# Patient Record
Sex: MALE | Race: WHITE | Employment: OTHER | URBAN - METROPOLITAN AREA
[De-identification: names, ages, dates, MRNs, and addresses within clinical notes are randomized per-mention and may not be internally consistent; named-entity substitution may affect disease eponyms.]

---

## 2019-10-19 ENCOUNTER — APPOINTMENT (OUTPATIENT)
Dept: CT IMAGING | Age: 84
End: 2019-10-19
Payer: MEDICARE

## 2019-10-19 ENCOUNTER — HOSPITAL ENCOUNTER (OUTPATIENT)
Age: 84
Setting detail: OBSERVATION
LOS: 1 days | Discharge: HOME | End: 2019-10-20
Attending: EMERGENCY MEDICINE | Admitting: INTERNAL MEDICINE
Payer: MEDICARE

## 2019-10-19 DIAGNOSIS — R41.0 DELIRIUM: Primary | ICD-10-CM

## 2019-10-19 DIAGNOSIS — R44.1 VISUAL HALLUCINATIONS: ICD-10-CM

## 2019-10-19 PROBLEM — G93.41 METABOLIC ENCEPHALOPATHY: Status: ACTIVE | Noted: 2019-10-19

## 2019-10-19 PROBLEM — R41.82 ALTERED MENTAL STATUS: Status: ACTIVE | Noted: 2019-10-19

## 2019-10-19 LAB
ALBUMIN SERPL-MCNC: 4.7 G/DL (ref 3.5–5.2)
ALP BLD-CCNC: 107 U/L (ref 40–129)
ALT SERPL-CCNC: 24 U/L (ref 0–40)
AMMONIA: <10 UMOL/L (ref 16–60)
ANION GAP SERPL CALCULATED.3IONS-SCNC: 11 MMOL/L (ref 7–16)
APTT: 29.5 SEC (ref 25.7–34.7)
AST SERPL-CCNC: 59 U/L (ref 0–39)
BACTERIA: ABNORMAL /HPF
BASOPHILS ABSOLUTE: 0.04 E9/L (ref 0–0.2)
BASOPHILS RELATIVE PERCENT: 0.6 % (ref 0–2)
BILIRUB SERPL-MCNC: 0.4 MG/DL (ref 0–1.2)
BILIRUBIN URINE: NEGATIVE
BLOOD, URINE: NEGATIVE
BUN BLDV-MCNC: 19 MG/DL (ref 8–23)
CALCIUM SERPL-MCNC: 9.6 MG/DL (ref 8.6–10.2)
CHLORIDE BLD-SCNC: 99 MMOL/L (ref 98–107)
CLARITY: CLEAR
CO2: 28 MMOL/L (ref 22–29)
COLOR: YELLOW
CREAT SERPL-MCNC: 1 MG/DL (ref 0.7–1.2)
EKG ATRIAL RATE: 83 BPM
EKG P AXIS: 47 DEGREES
EKG P-R INTERVAL: 134 MS
EKG Q-T INTERVAL: 376 MS
EKG QRS DURATION: 76 MS
EKG QTC CALCULATION (BAZETT): 439 MS
EKG R AXIS: 4 DEGREES
EKG T AXIS: 24 DEGREES
EKG VENTRICULAR RATE: 82 BPM
EOSINOPHILS ABSOLUTE: 0.13 E9/L (ref 0.05–0.5)
EOSINOPHILS RELATIVE PERCENT: 2.1 % (ref 0–6)
EPITHELIAL CELLS, UA: ABNORMAL /HPF
GFR AFRICAN AMERICAN: >60
GFR NON-AFRICAN AMERICAN: >60 ML/MIN/1.73
GLUCOSE BLD-MCNC: 102 MG/DL (ref 74–99)
GLUCOSE URINE: NEGATIVE MG/DL
HCT VFR BLD CALC: 33.8 % (ref 37–54)
HEMOGLOBIN: 11 G/DL (ref 12.5–16.5)
IMMATURE GRANULOCYTES #: 0.02 E9/L
IMMATURE GRANULOCYTES %: 0.3 % (ref 0–5)
INR BLD: 1.1
KETONES, URINE: NEGATIVE MG/DL
LACTIC ACID: 1.6 MMOL/L (ref 0.5–2.2)
LEUKOCYTE ESTERASE, URINE: NEGATIVE
LYMPHOCYTES ABSOLUTE: 0.78 E9/L (ref 1.5–4)
LYMPHOCYTES RELATIVE PERCENT: 12.6 % (ref 20–42)
MAGNESIUM: 1.9 MG/DL (ref 1.6–2.6)
MCH RBC QN AUTO: 31.1 PG (ref 26–35)
MCHC RBC AUTO-ENTMCNC: 32.5 % (ref 32–34.5)
MCV RBC AUTO: 95.5 FL (ref 80–99.9)
MONOCYTES ABSOLUTE: 0.86 E9/L (ref 0.1–0.95)
MONOCYTES RELATIVE PERCENT: 13.9 % (ref 2–12)
NEUTROPHILS ABSOLUTE: 4.34 E9/L (ref 1.8–7.3)
NEUTROPHILS RELATIVE PERCENT: 70.5 % (ref 43–80)
NITRITE, URINE: NEGATIVE
PDW BLD-RTO: 13.2 FL (ref 11.5–15)
PH UA: 7.5 (ref 5–9)
PLATELET # BLD: 202 E9/L (ref 130–450)
PMV BLD AUTO: 10.4 FL (ref 7–12)
POTASSIUM SERPL-SCNC: 5.7 MMOL/L (ref 3.5–5)
PROTEIN UA: NEGATIVE MG/DL
PROTHROMBIN TIME: 11.7 SEC (ref 9.3–12.4)
RBC # BLD: 3.54 E12/L (ref 3.8–5.8)
RBC UA: ABNORMAL /HPF (ref 0–2)
SODIUM BLD-SCNC: 138 MMOL/L (ref 132–146)
SPECIFIC GRAVITY UA: 1.01 (ref 1–1.03)
TOTAL CK: 176 U/L (ref 20–200)
TOTAL PROTEIN: 7.9 G/DL (ref 6.4–8.3)
TROPONIN: <0.01 NG/ML (ref 0–0.03)
UROBILINOGEN, URINE: 1 E.U./DL
WBC # BLD: 6.2 E9/L (ref 4.5–11.5)
WBC UA: ABNORMAL /HPF (ref 0–5)

## 2019-10-19 PROCEDURE — 93005 ELECTROCARDIOGRAM TRACING: CPT | Performed by: EMERGENCY MEDICINE

## 2019-10-19 PROCEDURE — A0426 ALS 1: HCPCS

## 2019-10-19 PROCEDURE — 84484 ASSAY OF TROPONIN QUANT: CPT

## 2019-10-19 PROCEDURE — 2580000003 HC RX 258: Performed by: EMERGENCY MEDICINE

## 2019-10-19 PROCEDURE — 82550 ASSAY OF CK (CPK): CPT

## 2019-10-19 PROCEDURE — 80053 COMPREHEN METABOLIC PANEL: CPT

## 2019-10-19 PROCEDURE — 81001 URINALYSIS AUTO W/SCOPE: CPT

## 2019-10-19 PROCEDURE — 85025 COMPLETE CBC W/AUTO DIFF WBC: CPT

## 2019-10-19 PROCEDURE — 93010 ELECTROCARDIOGRAM REPORT: CPT | Performed by: INTERNAL MEDICINE

## 2019-10-19 PROCEDURE — 36415 COLL VENOUS BLD VENIPUNCTURE: CPT

## 2019-10-19 PROCEDURE — 83735 ASSAY OF MAGNESIUM: CPT

## 2019-10-19 PROCEDURE — 96372 THER/PROPH/DIAG INJ SC/IM: CPT

## 2019-10-19 PROCEDURE — 82140 ASSAY OF AMMONIA: CPT

## 2019-10-19 PROCEDURE — 99285 EMERGENCY DEPT VISIT HI MDM: CPT

## 2019-10-19 PROCEDURE — 87088 URINE BACTERIA CULTURE: CPT

## 2019-10-19 PROCEDURE — 83605 ASSAY OF LACTIC ACID: CPT

## 2019-10-19 PROCEDURE — 70450 CT HEAD/BRAIN W/O DYE: CPT

## 2019-10-19 PROCEDURE — 85730 THROMBOPLASTIN TIME PARTIAL: CPT

## 2019-10-19 PROCEDURE — A0425 GROUND MILEAGE: HCPCS

## 2019-10-19 PROCEDURE — 2580000003 HC RX 258: Performed by: INTERNAL MEDICINE

## 2019-10-19 PROCEDURE — 85610 PROTHROMBIN TIME: CPT

## 2019-10-19 PROCEDURE — 6370000000 HC RX 637 (ALT 250 FOR IP): Performed by: INTERNAL MEDICINE

## 2019-10-19 PROCEDURE — 71046 X-RAY EXAM CHEST 2 VIEWS: CPT

## 2019-10-19 PROCEDURE — G0378 HOSPITAL OBSERVATION PER HR: HCPCS

## 2019-10-19 PROCEDURE — 94760 N-INVAS EAR/PLS OXIMETRY 1: CPT

## 2019-10-19 PROCEDURE — 6360000002 HC RX W HCPCS: Performed by: INTERNAL MEDICINE

## 2019-10-19 PROCEDURE — 87040 BLOOD CULTURE FOR BACTERIA: CPT

## 2019-10-19 RX ORDER — 0.9 % SODIUM CHLORIDE 0.9 %
1000 INTRAVENOUS SOLUTION INTRAVENOUS ONCE
Status: COMPLETED | OUTPATIENT
Start: 2019-10-19 | End: 2019-10-19

## 2019-10-19 RX ORDER — ASPIRIN 81 MG/1
81 TABLET, CHEWABLE ORAL DAILY
COMMUNITY
End: 2019-11-01

## 2019-10-19 RX ORDER — M-VIT,TX,IRON,MINS/CALC/FOLIC 27MG-0.4MG
1 TABLET ORAL DAILY
Status: DISCONTINUED | OUTPATIENT
Start: 2019-10-19 | End: 2019-10-20 | Stop reason: HOSPADM

## 2019-10-19 RX ORDER — SODIUM CHLORIDE 0.9 % (FLUSH) 0.9 %
10 SYRINGE (ML) INJECTION EVERY 12 HOURS SCHEDULED
Status: DISCONTINUED | OUTPATIENT
Start: 2019-10-19 | End: 2019-10-20 | Stop reason: HOSPADM

## 2019-10-19 RX ORDER — SODIUM CHLORIDE 0.9 % (FLUSH) 0.9 %
10 SYRINGE (ML) INJECTION PRN
Status: DISCONTINUED | OUTPATIENT
Start: 2019-10-19 | End: 2019-10-20 | Stop reason: HOSPADM

## 2019-10-19 RX ORDER — ONDANSETRON 2 MG/ML
4 INJECTION INTRAMUSCULAR; INTRAVENOUS EVERY 6 HOURS PRN
Status: DISCONTINUED | OUTPATIENT
Start: 2019-10-19 | End: 2019-10-20 | Stop reason: HOSPADM

## 2019-10-19 RX ORDER — LANOLIN ALCOHOL/MO/W.PET/CERES
500 CREAM (GRAM) TOPICAL DAILY
Status: DISCONTINUED | OUTPATIENT
Start: 2019-10-19 | End: 2019-10-20 | Stop reason: HOSPADM

## 2019-10-19 RX ORDER — VITS A,C,E/LUTEIN/MINERALS 300MCG-200
1 TABLET ORAL DAILY
Status: DISCONTINUED | OUTPATIENT
Start: 2019-10-19 | End: 2019-10-20 | Stop reason: HOSPADM

## 2019-10-19 RX ADMIN — Medication 10 ML: at 21:00

## 2019-10-19 RX ADMIN — ENOXAPARIN SODIUM 30 MG: 30 INJECTION SUBCUTANEOUS at 18:15

## 2019-10-19 RX ADMIN — Medication 500 MCG: at 18:15

## 2019-10-19 RX ADMIN — SODIUM CHLORIDE 1000 ML: 9 INJECTION, SOLUTION INTRAVENOUS at 07:21

## 2019-10-19 RX ADMIN — MULTIPLE VITAMINS W/ MINERALS TAB 1 TABLET: TAB at 18:17

## 2019-10-19 RX ADMIN — Medication 1 TABLET: at 18:16

## 2019-10-20 VITALS
DIASTOLIC BLOOD PRESSURE: 64 MMHG | WEIGHT: 103 LBS | TEMPERATURE: 98.7 F | RESPIRATION RATE: 14 BRPM | SYSTOLIC BLOOD PRESSURE: 123 MMHG | HEIGHT: 67 IN | OXYGEN SATURATION: 98 % | HEART RATE: 68 BPM | BODY MASS INDEX: 16.17 KG/M2

## 2019-10-20 LAB
C-REACTIVE PROTEIN: 0.6 MG/DL (ref 0–0.4)
SEDIMENTATION RATE, ERYTHROCYTE: 30 MM/HR (ref 0–15)

## 2019-10-20 PROCEDURE — G0378 HOSPITAL OBSERVATION PER HR: HCPCS

## 2019-10-20 PROCEDURE — 85651 RBC SED RATE NONAUTOMATED: CPT

## 2019-10-20 PROCEDURE — 86140 C-REACTIVE PROTEIN: CPT

## 2019-10-20 PROCEDURE — 6370000000 HC RX 637 (ALT 250 FOR IP): Performed by: INTERNAL MEDICINE

## 2019-10-20 PROCEDURE — 2580000003 HC RX 258: Performed by: INTERNAL MEDICINE

## 2019-10-20 PROCEDURE — 96372 THER/PROPH/DIAG INJ SC/IM: CPT

## 2019-10-20 PROCEDURE — 36415 COLL VENOUS BLD VENIPUNCTURE: CPT

## 2019-10-20 PROCEDURE — 6360000002 HC RX W HCPCS: Performed by: INTERNAL MEDICINE

## 2019-10-20 RX ADMIN — Medication 500 MCG: at 08:50

## 2019-10-20 RX ADMIN — ENOXAPARIN SODIUM 30 MG: 30 INJECTION SUBCUTANEOUS at 08:48

## 2019-10-20 RX ADMIN — MULTIPLE VITAMINS W/ MINERALS TAB 1 TABLET: TAB at 08:49

## 2019-10-20 RX ADMIN — Medication 1 TABLET: at 09:42

## 2019-10-20 RX ADMIN — Medication 10 ML: at 08:50

## 2019-10-20 ASSESSMENT — PAIN SCALES - GENERAL: PAINLEVEL_OUTOF10: 0

## 2019-10-21 LAB — URINE CULTURE, ROUTINE: NORMAL

## 2019-10-22 ENCOUNTER — HOSPITAL ENCOUNTER (OUTPATIENT)
Age: 84
Discharge: HOME | End: 2019-10-22
Payer: MEDICARE

## 2019-10-24 LAB — BLOOD CULTURE, ROUTINE: NORMAL

## 2019-11-01 ENCOUNTER — APPOINTMENT (OUTPATIENT)
Dept: GENERAL RADIOLOGY | Age: 84
DRG: 072 | End: 2019-11-01
Payer: MEDICARE

## 2019-11-01 ENCOUNTER — HOSPITAL ENCOUNTER (OUTPATIENT)
Age: 84
Discharge: HOME | End: 2019-11-01
Payer: MEDICARE

## 2019-11-01 ENCOUNTER — APPOINTMENT (OUTPATIENT)
Dept: ULTRASOUND IMAGING | Age: 84
DRG: 072 | End: 2019-11-01
Payer: MEDICARE

## 2019-11-01 ENCOUNTER — HOSPITAL ENCOUNTER (INPATIENT)
Age: 84
LOS: 2 days | Discharge: HOME | DRG: 072 | End: 2019-11-03
Attending: EMERGENCY MEDICINE | Admitting: INTERNAL MEDICINE
Payer: MEDICARE

## 2019-11-01 LAB
ACETAMINOPHEN LEVEL: <5 MCG/ML (ref 10–30)
ALBUMIN SERPL-MCNC: 4.3 G/DL (ref 3.5–5.2)
ALP BLD-CCNC: 96 U/L (ref 40–129)
ALT SERPL-CCNC: 14 U/L (ref 0–40)
AMMONIA: <10 UMOL/L (ref 16–60)
AMPHETAMINE SCREEN, URINE: NOT DETECTED
ANION GAP SERPL CALCULATED.3IONS-SCNC: 13 MMOL/L (ref 7–16)
AST SERPL-CCNC: 28 U/L (ref 0–39)
BARBITURATE SCREEN URINE: NOT DETECTED
BASOPHILS ABSOLUTE: 0.04 E9/L (ref 0–0.2)
BASOPHILS RELATIVE PERCENT: 0.8 % (ref 0–2)
BENZODIAZEPINE SCREEN, URINE: NOT DETECTED
BILIRUB SERPL-MCNC: 0.5 MG/DL (ref 0–1.2)
BILIRUBIN URINE: NEGATIVE
BLOOD, URINE: NEGATIVE
BUN BLDV-MCNC: 12 MG/DL (ref 8–23)
CALCIUM SERPL-MCNC: 9.7 MG/DL (ref 8.6–10.2)
CANNABINOID SCREEN URINE: NOT DETECTED
CEA: 4.5 NG/ML (ref 0–5.2)
CHLORIDE BLD-SCNC: 102 MMOL/L (ref 98–107)
CLARITY: CLEAR
CO2: 27 MMOL/L (ref 22–29)
COCAINE METABOLITE SCREEN URINE: NOT DETECTED
COLOR: YELLOW
CREAT SERPL-MCNC: 0.8 MG/DL (ref 0.7–1.2)
EKG ATRIAL RATE: 87 BPM
EKG P AXIS: 35 DEGREES
EKG P-R INTERVAL: 116 MS
EKG Q-T INTERVAL: 364 MS
EKG QRS DURATION: 80 MS
EKG QTC CALCULATION (BAZETT): 438 MS
EKG R AXIS: -8 DEGREES
EKG T AXIS: 15 DEGREES
EKG VENTRICULAR RATE: 87 BPM
EOSINOPHILS ABSOLUTE: 0.09 E9/L (ref 0.05–0.5)
EOSINOPHILS RELATIVE PERCENT: 1.8 % (ref 0–6)
ETHANOL: <10 MG/DL (ref 0–0.08)
GFR AFRICAN AMERICAN: >60
GFR NON-AFRICAN AMERICAN: >60 ML/MIN/1.73
GLUCOSE BLD-MCNC: 93 MG/DL (ref 74–99)
GLUCOSE URINE: NEGATIVE MG/DL
HCT VFR BLD CALC: 30.4 % (ref 37–54)
HEMOGLOBIN: 9.8 G/DL (ref 12.5–16.5)
IMMATURE GRANULOCYTES #: 0.04 E9/L
IMMATURE GRANULOCYTES %: 0.8 % (ref 0–5)
KETONES, URINE: NEGATIVE MG/DL
LEUKOCYTE ESTERASE, URINE: NEGATIVE
LYMPHOCYTES ABSOLUTE: 0.81 E9/L (ref 1.5–4)
LYMPHOCYTES RELATIVE PERCENT: 15.8 % (ref 20–42)
Lab: NORMAL
MCH RBC QN AUTO: 31.1 PG (ref 26–35)
MCHC RBC AUTO-ENTMCNC: 32.2 % (ref 32–34.5)
MCV RBC AUTO: 96.5 FL (ref 80–99.9)
METHADONE SCREEN, URINE: NOT DETECTED
MONOCYTES ABSOLUTE: 0.67 E9/L (ref 0.1–0.95)
MONOCYTES RELATIVE PERCENT: 13.1 % (ref 2–12)
NEUTROPHILS ABSOLUTE: 3.47 E9/L (ref 1.8–7.3)
NEUTROPHILS RELATIVE PERCENT: 67.7 % (ref 43–80)
NITRITE, URINE: NEGATIVE
OPIATE SCREEN URINE: NOT DETECTED
PDW BLD-RTO: 13.1 FL (ref 11.5–15)
PH UA: 6.5 (ref 5–9)
PHENCYCLIDINE SCREEN URINE: NOT DETECTED
PLATELET # BLD: 256 E9/L (ref 130–450)
PMV BLD AUTO: 9.8 FL (ref 7–12)
POTASSIUM SERPL-SCNC: 4 MMOL/L (ref 3.5–5)
PROPOXYPHENE SCREEN: NOT DETECTED
PROTEIN UA: NEGATIVE MG/DL
RBC # BLD: 3.15 E12/L (ref 3.8–5.8)
SALICYLATE, SERUM: <0.3 MG/DL (ref 0–30)
SODIUM BLD-SCNC: 142 MMOL/L (ref 132–146)
SPECIFIC GRAVITY UA: <=1.005 (ref 1–1.03)
TOTAL PROTEIN: 8.4 G/DL (ref 6.4–8.3)
TRICYCLIC ANTIDEPRESSANTS SCREEN SERUM: NEGATIVE NG/ML
TROPONIN: <0.01 NG/ML (ref 0–0.03)
UROBILINOGEN, URINE: 0.2 E.U./DL
WBC # BLD: 5.1 E9/L (ref 4.5–11.5)

## 2019-11-01 PROCEDURE — 96372 THER/PROPH/DIAG INJ SC/IM: CPT

## 2019-11-01 PROCEDURE — 1200000000 HC SEMI PRIVATE

## 2019-11-01 PROCEDURE — 80307 DRUG TEST PRSMV CHEM ANLYZR: CPT

## 2019-11-01 PROCEDURE — 93010 ELECTROCARDIOGRAM REPORT: CPT | Performed by: INTERNAL MEDICINE

## 2019-11-01 PROCEDURE — 96375 TX/PRO/DX INJ NEW DRUG ADDON: CPT

## 2019-11-01 PROCEDURE — 93005 ELECTROCARDIOGRAM TRACING: CPT | Performed by: EMERGENCY MEDICINE

## 2019-11-01 PROCEDURE — 2580000003 HC RX 258: Performed by: EMERGENCY MEDICINE

## 2019-11-01 PROCEDURE — 36415 COLL VENOUS BLD VENIPUNCTURE: CPT

## 2019-11-01 PROCEDURE — G0480 DRUG TEST DEF 1-7 CLASSES: HCPCS

## 2019-11-01 PROCEDURE — A0428 BLS: HCPCS

## 2019-11-01 PROCEDURE — 80053 COMPREHEN METABOLIC PANEL: CPT

## 2019-11-01 PROCEDURE — 2580000003 HC RX 258: Performed by: INTERNAL MEDICINE

## 2019-11-01 PROCEDURE — 71045 X-RAY EXAM CHEST 1 VIEW: CPT

## 2019-11-01 PROCEDURE — 96374 THER/PROPH/DIAG INJ IV PUSH: CPT

## 2019-11-01 PROCEDURE — 97165 OT EVAL LOW COMPLEX 30 MIN: CPT

## 2019-11-01 PROCEDURE — A0425 GROUND MILEAGE: HCPCS

## 2019-11-01 PROCEDURE — 76705 ECHO EXAM OF ABDOMEN: CPT

## 2019-11-01 PROCEDURE — 84484 ASSAY OF TROPONIN QUANT: CPT

## 2019-11-01 PROCEDURE — 97530 THERAPEUTIC ACTIVITIES: CPT

## 2019-11-01 PROCEDURE — 85025 COMPLETE CBC W/AUTO DIFF WBC: CPT

## 2019-11-01 PROCEDURE — 6360000002 HC RX W HCPCS: Performed by: INTERNAL MEDICINE

## 2019-11-01 PROCEDURE — 81003 URINALYSIS AUTO W/O SCOPE: CPT

## 2019-11-01 PROCEDURE — 82378 CARCINOEMBRYONIC ANTIGEN: CPT

## 2019-11-01 PROCEDURE — 82140 ASSAY OF AMMONIA: CPT

## 2019-11-01 PROCEDURE — 70450 CT HEAD/BRAIN W/O DYE: CPT

## 2019-11-01 PROCEDURE — 6370000000 HC RX 637 (ALT 250 FOR IP): Performed by: INTERNAL MEDICINE

## 2019-11-01 PROCEDURE — 6360000002 HC RX W HCPCS: Performed by: EMERGENCY MEDICINE

## 2019-11-01 PROCEDURE — 99285 EMERGENCY DEPT VISIT HI MDM: CPT

## 2019-11-01 PROCEDURE — 96361 HYDRATE IV INFUSION ADD-ON: CPT

## 2019-11-01 RX ORDER — DIPHENHYDRAMINE HYDROCHLORIDE 50 MG/ML
12.5 INJECTION INTRAMUSCULAR; INTRAVENOUS ONCE
Status: COMPLETED | OUTPATIENT
Start: 2019-11-01 | End: 2019-11-01

## 2019-11-01 RX ORDER — 0.9 % SODIUM CHLORIDE 0.9 %
250 INTRAVENOUS SOLUTION INTRAVENOUS ONCE
Status: COMPLETED | OUTPATIENT
Start: 2019-11-01 | End: 2019-11-01

## 2019-11-01 RX ORDER — LORAZEPAM 2 MG/ML
0.5 INJECTION INTRAMUSCULAR ONCE
Status: COMPLETED | OUTPATIENT
Start: 2019-11-01 | End: 2019-11-01

## 2019-11-01 RX ORDER — M-VIT,TX,IRON,MINS/CALC/FOLIC 27MG-0.4MG
1 TABLET ORAL DAILY
Status: DISCONTINUED | OUTPATIENT
Start: 2019-11-01 | End: 2019-11-03 | Stop reason: HOSPADM

## 2019-11-01 RX ORDER — HYDROCODONE/ACETAMINOPHEN 5 MG-500MG
1 TABLET ORAL DAILY
Status: DISCONTINUED | OUTPATIENT
Start: 2019-11-01 | End: 2019-11-01 | Stop reason: CLARIF

## 2019-11-01 RX ORDER — SODIUM CHLORIDE 0.9 % (FLUSH) 0.9 %
10 SYRINGE (ML) INJECTION PRN
Status: DISCONTINUED | OUTPATIENT
Start: 2019-11-01 | End: 2019-11-03 | Stop reason: HOSPADM

## 2019-11-01 RX ORDER — MEMANTINE HYDROCHLORIDE 5 MG/1
5 TABLET ORAL DAILY
Status: DISCONTINUED | OUTPATIENT
Start: 2019-11-01 | End: 2019-11-03 | Stop reason: HOSPADM

## 2019-11-01 RX ORDER — ONDANSETRON 2 MG/ML
4 INJECTION INTRAMUSCULAR; INTRAVENOUS EVERY 6 HOURS PRN
Status: DISCONTINUED | OUTPATIENT
Start: 2019-11-01 | End: 2019-11-03 | Stop reason: HOSPADM

## 2019-11-01 RX ORDER — TRAZODONE HYDROCHLORIDE 50 MG/1
25 TABLET ORAL NIGHTLY
Status: DISCONTINUED | OUTPATIENT
Start: 2019-11-01 | End: 2019-11-03 | Stop reason: HOSPADM

## 2019-11-01 RX ORDER — SODIUM CHLORIDE 0.9 % (FLUSH) 0.9 %
10 SYRINGE (ML) INJECTION EVERY 12 HOURS SCHEDULED
Status: DISCONTINUED | OUTPATIENT
Start: 2019-11-01 | End: 2019-11-03 | Stop reason: HOSPADM

## 2019-11-01 RX ADMIN — DIPHENHYDRAMINE HYDROCHLORIDE 12.5 MG: 50 INJECTION, SOLUTION INTRAMUSCULAR; INTRAVENOUS at 02:20

## 2019-11-01 RX ADMIN — LORAZEPAM 0.5 MG: 2 INJECTION, SOLUTION INTRAMUSCULAR; INTRAVENOUS at 04:19

## 2019-11-01 RX ADMIN — Medication 10 ML: at 12:21

## 2019-11-01 RX ADMIN — TRAZODONE HYDROCHLORIDE 25 MG: 50 TABLET ORAL at 21:34

## 2019-11-01 RX ADMIN — MULTIPLE VITAMINS W/ MINERALS TAB 1 TABLET: TAB at 12:21

## 2019-11-01 RX ADMIN — ENOXAPARIN SODIUM 40 MG: 40 INJECTION SUBCUTANEOUS at 12:21

## 2019-11-01 RX ADMIN — Medication 10 ML: at 21:35

## 2019-11-01 RX ADMIN — SODIUM CHLORIDE 250 ML: 9 INJECTION, SOLUTION INTRAVENOUS at 02:09

## 2019-11-01 ASSESSMENT — PAIN SCALES - GENERAL
PAINLEVEL_OUTOF10: 0

## 2019-11-01 NOTE — H&P
Admission History and Physical                                                                                    Mark Cervantes MD, FACP                   Patient Name: Erica Gomez                   Age:  80 y.o. Gender:   male    CC: Visual hallucinations-and altered mental status    HPI: Patient recently discharged for the same symptoms. The patient had been on 20 mg of paroxetine. This was stopped and he resolved. However then he was not sleeping. He came to the office and was doing reasonably well with no more hallucinations. He opted to try 7.5 mg of mirtazapine. He started having difficulty with that so it was stopped the same reason. Then he was unable to sleep and not slept for 24 hours. Then he began demonstrating paranoid behavior and had visual hallucinations. He has a history of dementia. 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  Hoarseness     Hyperlipidemia     Hypertension     no recent problems    Macular degeneration        Past Surgical History:   Procedure Laterality Date    APPENDECTOMY      CATARACT REMOVAL WITH IMPLANT Bilateral     LARYNGOSCOPY  01/30/2014    LUNG SURGERY Left 20 years ago    \"portion of lung removed, bottom lobe for mass, no further traetments\"       No family status information on file. Prior to Admission medications    Not on File        Social History     Socioeconomic History    Marital status:       Spouse name: None    Number of children: None    Years of education: None    Highest education level: None   Occupational History    None   Social Needs    Financial resource strain: None    Food insecurity:     Worry: None     Inability: None    Transportation needs:     Medical: None BP Readings from Last 3 Encounters:   11/01/19 (!) 158/67   10/20/19 123/64   03/08/17 (!) 160/70     Pulse Readings from Last 3 Encounters:   11/01/19 85   10/20/19 68   03/08/17 76       General appearance: Frail somnolent following Ativan  Skin: Color-sallow, texture, turgor normal. No rashes or lesions. Head: Normocephalic. No masses, lesions, tenderness or abnormalities   Face: Symmetric no visible lesions  Eyes: Conjunctivae/cornea clear. [place]. Sclera non icteric. Ears: External appearance normal.  Hearing grossly reduced  Mouth: Lips and tongue appear normal.   Neck:  Symmetric. No adenopathy. Lungs: Clear to auscultation. No rhonchi, crackles or rales. Heart: S1 > S2. Rhythm is regular and rate is normal. No gallop rub or murmur. Abdomen: Soft, there is a suspected mass in the epigastrium and across over the liver this is not been noted on previous examinations. However on past examinations his abdomen has always been tense  Extremities: No deformities, edema, or skin discoloration. Peripheral perfusion assessed in all exremities. No cyanosis  Musculoskeletal: No unusual pain or swelling. Muscular strength intact. Neuro:   · Cranial nerves grossly intact.    · The rest is difficult to assess because he has been given sedation  Mental status: Sedated  Mood: Sedated  Gait & balance: Normally ambulatory independently     Labs     CBC:   Lab Results   Component Value Date    WBC 5.1 11/01/2019    RBC 3.15 11/01/2019    HGB 9.8 11/01/2019    HCT 30.4 11/01/2019     11/01/2019    MCV 96.5 11/01/2019     BMP:    Lab Results   Component Value Date     11/01/2019    K 4.0 11/01/2019     11/01/2019    CO2 27 11/01/2019    BUN 12 11/01/2019    CREATININE 0.8 11/01/2019    GLUCOSE 93 11/01/2019    GLUCOSE 93 06/05/2012    CALCIUM 9.7 11/01/2019     Hepatic Function Panel:    Lab Results   Component Value Date    ALKPHOS 96 11/01/2019    AST 28 11/01/2019    ALT 14 11/01/2019 PROT 8.4 11/01/2019    LABALBU 4.3 11/01/2019    LABALBU 4.3 06/05/2012    BILITOT 0.5 11/01/2019     Magnesium:    Lab Results   Component Value Date    MG 1.9 10/19/2019     Cardiac Enzymes:   Lab Results   Component Value Date    CKTOTAL 176 10/19/2019    CKTOTAL 74 03/31/2011    CKMB 1.1 03/31/2011    TROPONINI <0.01 11/01/2019    TROPONINI <0.01 10/19/2019    TROPONINI <0.01 03/31/2011     LDH:  No results found for: LDH  PT/INR:    Lab Results   Component Value Date    PROTIME 11.7 10/19/2019    PROTIME 11.6 03/31/2011    INR 1.1 10/19/2019     BNP: No results for input(s): BNP in the last 72 hours. TSH: No results found for: TSH   Cardiac Injury Profile:   Recent Labs     11/01/19  0215   TROPONINI <0.01      Lipid Profile:   Lab Results   Component Value Date    TRIG 92 01/27/2014    HDL 55 01/27/2014    LDLCALC 81 01/27/2014    CHOL 154 01/27/2014      Hemoglobin A1C: No components found for: HGBA1C   U/A:   Lab Results   Component Value Date    LEUKOCYTESUR Negative 11/01/2019    PHUR 6.5 11/01/2019    WBCUA 0-1 10/19/2019    RBCUA NONE 10/19/2019    BACTERIA RARE 10/19/2019    SPECGRAV <=1.005 11/01/2019    BLOODU Negative 11/01/2019    GLUCOSEU Negative 11/01/2019         ADMISSION SCHEDULED MEDS:   No current facility-administered medications for this encounter. Current  Infusions      Prn Meds      Radiology Review:  XR CHEST 1 VW   Final Result   Calcified pleural plaques pleural-parenchymal scarring. CT Head WO Contrast   Final Result   1. Chronic microvascular infarcts and mild generalized volume loss. 2. Negative for acute intracranial pathology. 3. Scattered tiny bubbles of gas adjacent to the facial bones and the skull    base, most likely intravenous and likely iatrogenic.        This report has been electronically signed by aMuri Mckinney MD.            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 However this does not not appear to be recent    Problem list:  Patient Active Problem List   Diagnosis    Altered mental status    Metabolic encephalopathy       PLAN:  I suspect abdominal neoplasm.   Will obtain imaging  Advanced directive status is DNR CC-no intervention desired   Will start trazodone for sleep  Start transition to skilled facility            See  Orders  Yovani Orellana MD, Da Dawson, American Board of Internal Medicine  Diplomate, Geriatric Medicine, [address] of Internal Medicine  9:45 AM  11/1/2019

## 2019-11-01 NOTE — CARE COORDINATION
Met with both daughters, discussed 24hr care, Naresh Crisostomo who lives with pt states that family lives within 5 minutes of their home and family and friends visit and that it is a busy household. They are declining HHC at this point. Kathi LUBIN

## 2019-11-01 NOTE — PLAN OF CARE
Problem: Risk for Impaired Skin Integrity  Goal: Tissue integrity - skin and mucous membranes  Description  Structural intactness and normal physiological function of skin and  mucous membranes.   Outcome: Met This Shift     Problem: Confusion - Acute:  Goal: Absence of continued neurological deterioration signs and symptoms  Description  Absence of continued neurological deterioration signs and symptoms  Outcome: Met This Shift

## 2019-11-01 NOTE — PROGRESS NOTES
OCCUPATIONAL THERAPY INITIAL EVALUATION      Date:2019  Patient Name: Best Ross  MRN: [id number]  : 2/10/1927  Room: [address] [place] [address]      Evaluating OT: Carlynn Merlin, OTR/L #[id number]    AM-PAC Daily Activity Raw Score: 1824    Recommended Adaptive Equipment: To be further assessed      Diagnosis:    1. Altered mental status, unspecified altered mental status type          Pertinent Medical History: macular degeneration, dementia     Precautions:  Falls, bed alarm     Home Living: Pt lives with daughter(who works 5am-1)  in a 1 story with 1 step(s) to enter and 0rail(s); Bathroom setup: tub with no DME  Equipment owned: none  Prior Level of Function: Independent  with ADLs , assist  with IADLs; using no AD for ambulation. Driving: no    Pain Level: 0/10  Cognition: A&O: self only, pt stated his daughter was his wife, confused to place, month and year; Follows 2 step directions   Memory:  poor   Sequencing:  fair    Problem solving:  fair    Judgement/safety:  poor     Functional Assessment:   Initial Eval Status  Date: 19 Treatment Status  Date: Short Term Goals  Treatment frequency: PRN    Feeding Stand by Assist   Independent    Grooming Stand by Assist   Supervision    UB Dressing Stand by Assist   Supervision    LB Dressing Stand by Assist   Supervision    Bathing Minimal Assist  Supervision    Toileting Stand by Assist   Supervision    Bed Mobility  Supine to sit: NT   Sit to supine: Supervision   Supine to sit:  Independent   Sit to supine: Independent    Functional Transfers Sit to stand: Supervision   Stand to sit: Supervision   Stand pivot: Stand by Assist   Modified [place]    Functional Mobility SBA with no AD  supervision for safety   Balance Sitting:     Static:  wfl    Dynamic:SBA  Standing: CGA     Activity Tolerance fair     Visual/  Perceptual Glasses: no  Macular degeneration                Hand dominance: R  UE ROM: RUE:  WFL  LUE:  WFL  Strength: RUE: grossly 4/5 LUE: grossly 4/5   Strength: B WFL  Fine Motor Coordination:  B WFL    Hearing: WFL  Sensation:  No c/o numbness or tingling  Tone:  WFL  Edema: None noted                            Comments/Treatment: Upon arrival, patient in the bathroom with daughter present in room standing by bathroom door. Pt was disoriented to place, month, year and daughter. Pt demonstrated slight unsteady dynamic standing balance during functional mobility with no AD. At end of session, patient in bed, bed alarm activated, with call light and phone within reach, all lines and tubes intact. Pt demonstrating poor understanding of education/techniques. Patient would benefit from continued OT  to improve cognition,  functional independence and quality of life. Eval Complexity: Low    Assessment of current deficits   Functional mobility [x]  ADLs [x] Strength [x]  Cognition [x]  Functional transfers  [x] IADLs [] Safety Awareness [x]  Endurance [x]  Fine Motor Coordination [] Balance [x] Vision/perception [x] Sensation []   Gross Motor Coordination [] ROM [] Delirium []                  Motor Control []    Plan of Care:   ADL retraining [x]   Equipment needs [x]   Neuromuscular re-education [x] Energy Conservation Techniques [x]  Functional Transfer training [x] Patient and/or Family Education [x]  Functional Mobility training [x]  Environmental Modifications [x]  Cognitive re-training [x]   Compensatory techniques for ADLs [x]  Splinting Needs []   Positioning to improve overall function [x]   Therapeutic Activity [x]  Therapeutic Exercise  [x]  Visual/Perceptual: [x]    Delirium prevention/treatment  []   Other:  []    Rehab Potential: Good for established goals    Patient / Family Goal:  Not stated     [] Malnutrition indicators have been identified and nursing has been notified to ensure a dietitian consult is ordered.      Evaluation time includes thorough review of current medical information, gathering information on past medical &

## 2019-11-01 NOTE — ED PROVIDER NOTES
HPI:  11/1/19,   Time: 2:07 AM         Suzi Monroy is a 80 y.o. male presenting to the ED for altered mental status and hallucinations, beginning more than 6 hours ago. The complaint has been intermittent, moderate in severity, and worsened by nothing. Family also reports patient has not slept in over 24 hours. He was recently hospitalized for altered mental status approximately 2 weeks ago and had some medication changed at that time. He was recently started on Remeron but was advised by PCP office today to stop the Remeron. Family reports that he lives with his other daughter and that was seeing things and the thought that people were out to get him according to the family and again they state he has not slept in over 24 hours    ROS:   Pertinent positives and negatives are stated within HPI, all other systems reviewed and are negative.  --------------------------------------------- PAST HISTORY ---------------------------------------------  Past Medical History:  has a past medical history of Cancer ([facility] [address] History of EKG, Hoarseness, Hyperlipidemia, Hypertension, and Macular degeneration. Past Surgical History:  has a past surgical history that includes Lung surgery (Left, 20 years ago); Cataract removal with implant (Bilateral); Appendectomy; and laryngoscopy (01/30/2014). Social History:  reports that he has quit smoking. He has never used smokeless tobacco. He reports that he drinks alcohol. He reports that he does not use drugs. Family History: family history is not on file. The patients home medications have been reviewed. Allergies: Patient has no known allergies.     -------------------------------------------------- RESULTS -------------------------------------------------  All laboratory and radiology results have been personally reviewed by myself   LABS:  Results for orders placed or performed during the hospital encounter of 11/01/19   Comprehensive Metabolic Panel   Result Value Ref Range    Sodium 142 132 - 146 mmol/L    Potassium 4.0 3.5 - 5.0 mmol/L    Chloride 102 98 - 107 mmol/L    CO2 27 22 - 29 mmol/L    Anion Gap 13 7 - 16 mmol/L    Glucose 93 74 - 99 mg/dL    BUN 12 8 - 23 mg/dL    CREATININE 0.8 0.7 - 1.2 mg/dL    GFR Non-African American >60 >=60 mL/min/1.73    GFR African American >60     Calcium 9.7 8.6 - 10.2 mg/dL    Total Protein 8.4 (H) 6.4 - 8.3 g/dL    Alb 4.3 3.5 - 5.2 g/dL    Total Bilirubin 0.5 0.0 - 1.2 mg/dL    Alkaline Phosphatase 96 40 - 129 U/L    ALT 14 0 - 40 U/L    AST 28 0 - 39 U/L   CBC Auto Differential   Result Value Ref Range    WBC 5.1 4.5 - 11.5 E9/L    RBC 3.15 (L) 3.80 - 5.80 E12/L    Hemoglobin 9.8 (L) 12.5 - 16.5 g/dL    Hematocrit 30.4 (L) 37.0 - 54.0 %    MCV 96.5 80.0 - 99.9 fL    MCH 31.1 26.0 - 35.0 pg    MCHC 32.2 32.0 - 34.5 %    RDW 13.1 11.5 - 15.0 fL    Platelets 293 823 - 744 E9/L    MPV 9.8 7.0 - 12.0 fL    Neutrophils % 67.7 43.0 - 80.0 %    Immature Granulocytes % 0.8 0.0 - 5.0 %    Lymphocytes % 15.8 (L) 20.0 - 42.0 %    Monocytes % 13.1 (H) 2.0 - 12.0 %    Eosinophils % 1.8 0.0 - 6.0 %    Basophils % 0.8 0.0 - 2.0 %    Neutrophils Absolute 3.47 1.80 - 7.30 E9/L    Immature Granulocytes # 0.04 E9/L    Lymphocytes Absolute 0.81 (L) 1.50 - 4.00 E9/L    Monocytes Absolute 0.67 0.10 - 0.95 E9/L    Eosinophils Absolute 0.09 0.05 - 0.50 E9/L    Basophils Absolute 0.04 0.00 - 0.20 E9/L   Troponin   Result Value Ref Range    Troponin <0.01 0.00 - 0.03 ng/mL   Urinalysis   Result Value Ref Range    Color, UA Yellow Straw/Yellow    Clarity, UA Clear Clear    Glucose, Ur Negative Negative mg/dL    Bilirubin Urine Negative Negative    Ketones, Urine Negative Negative mg/dL    Specific Gravity, UA <=1.005 1.005 - 1.030    Blood, Urine Negative Negative    pH, UA 6.5 5.0 - 9.0    Protein, UA Negative Negative mg/dL    Urobilinogen, Urine 0.2 <2.0 E.U./dL    Nitrite, Urine Negative Negative    Leukocyte Esterase, Urine Negative Negative   Serum Drug Screen   Result Value Ref Range    Ethanol Lvl <10 mg/dL    Acetaminophen Level <5.0 (L) 10.0 - 00.7 mcg/mL    Salicylate, Serum <8.7 0.0 - 30.0 mg/dL       RADIOLOGY:  Interpreted by Radiologist.  CT Head WO Contrast   Final Result   1. Chronic microvascular infarcts and mild generalized volume loss. 2. Negative for acute intracranial pathology. 3. Scattered tiny bubbles of gas adjacent to the facial bones and the skull    base, most likely intravenous and likely iatrogenic. This report has been electronically signed by Melonie Olvera MD.      XR CHEST 1 VW    (Results Pending)       ------------------------- NURSING NOTES AND VITALS REVIEWED ---------------------------   The nursing notes within the ED encounter and vital signs as below have been reviewed. BP (!) 158/88   Pulse 96   Temp 98.7 °F (37.1 °C) (Oral)   Resp 20   Ht 5' 6\" (1.676 m)   Wt 100 lb (45.4 kg)   SpO2 98%   BMI 16.14 kg/m²   Oxygen Saturation Interpretation: Normal      ---------------------------------------------------PHYSICAL EXAM--------------------------------------      Constitutional/General: Alert and oriented x3, well appearing, non toxic in NAD; patient is awake and cooperative but is confused  Head: NC/AT  Eyes: PERRL, EOMI  Mouth: Oropharynx clear, handling secretions, no trismus  Neck: Supple, full ROM, no meningeal signs  Pulmonary: Lungs clear to auscultation bilaterally, no wheezes, rales, or rhonchi. Not in respiratory distress  Cardiovascular:  Regular rate and rhythm, no murmurs, gallops, or rubs. 2+ distal pulses  Abdomen: Soft, non tender, non distended,   Extremities: Moves all extremities x 4. Warm and well perfused  Skin: warm and dry without rash  Neurologic: GCS 15, he does not appear to have any focal deficits. Cranial nerves II through XII are intact. He has good strength upper and lower extremities. He has no drift.   Speech is normal.  He does not appear to be hallucinating at this time unspecified altered mental status type        DISPOSITION  Disposition: Admit to med/surg floor  Patient condition is stable                  Bimal Malagon MD  11/01/19 4001

## 2019-11-02 PROCEDURE — 96372 THER/PROPH/DIAG INJ SC/IM: CPT

## 2019-11-02 PROCEDURE — 6360000002 HC RX W HCPCS: Performed by: INTERNAL MEDICINE

## 2019-11-02 PROCEDURE — 6370000000 HC RX 637 (ALT 250 FOR IP): Performed by: INTERNAL MEDICINE

## 2019-11-02 PROCEDURE — 1200000000 HC SEMI PRIVATE

## 2019-11-02 PROCEDURE — 2580000003 HC RX 258: Performed by: INTERNAL MEDICINE

## 2019-11-02 RX ADMIN — MULTIPLE VITAMINS W/ MINERALS TAB 1 TABLET: TAB at 09:16

## 2019-11-02 RX ADMIN — Medication 10 ML: at 09:16

## 2019-11-02 RX ADMIN — ENOXAPARIN SODIUM 40 MG: 40 INJECTION SUBCUTANEOUS at 09:16

## 2019-11-02 RX ADMIN — Medication 10 ML: at 20:05

## 2019-11-02 RX ADMIN — MEMANTINE HYDROCHLORIDE 5 MG: 5 TABLET, FILM COATED ORAL at 09:16

## 2019-11-02 RX ADMIN — TRAZODONE HYDROCHLORIDE 25 MG: 50 TABLET ORAL at 20:05

## 2019-11-02 ASSESSMENT — PAIN SCALES - GENERAL
PAINLEVEL_OUTOF10: 0

## 2019-11-02 NOTE — PROGRESS NOTES
Kristie Smalls MD, FACP                   Patient Name: Marilu Rangel                   Age:  80 y.o. Gender:   male    CC: Visual hallucinations-and altered mental status    HPI: Patient recently discharged for the same symptoms. The patient had been on 20 mg of paroxetine. This was stopped and he resolved. However then he was not sleeping. He came to the office and was doing reasonably well with no more hallucinations. He opted to try 7.5 mg of mirtazapine. He started having difficulty with that so it was stopped the same reason. Then he was unable to sleep and not slept for 24 hours. Then he began demonstrating paranoid behavior and had visual hallucinations. He has a history of dementia. He was seen this morning with his daughter and the history was confirmed    11/2  After some careful hydration and start of trazodone and Namenda patient is awake and alert and feels well  Daughter is at bedside there have been no more episodes of hallucination or behaviors  He states he is quite comfortable  The ultrasound of the abdomen is negative  The patient has no symptoms  Discussed with daughter and the need for further evaluation is overshadowed by the patient's age and general condition. 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     Hoarseness     Hyperlipidemia     Hypertension     no recent problems    Macular degeneration        Past Surgical History:   Procedure Laterality Date    APPENDECTOMY      CATARACT REMOVAL WITH IMPLANT Bilateral     LARYNGOSCOPY  01/30/2014    LUNG SURGERY Left 20 years ago    \"portion of lung removed, bottom lobe for mass, no further traetments\"       No family status information on bowels. He has always been very slight and in fact has gained some weight since last visit. There has been no dramatic weight loss      Physical Examination:      Wt Readings from Last 3 Encounters:   11/01/19 101 lb 6 oz (46 kg)   10/19/19 103 lb (46.7 kg)   03/08/17 103 lb (46.7 kg)     Temp Readings from Last 3 Encounters:   11/02/19 97.6 °F (36.4 °C) (Temporal)   10/20/19 98.7 °F (37.1 °C) (Temporal)   03/08/17 98.2 °F (36.8 °C)     BP Readings from Last 3 Encounters:   11/02/19 (!) 151/78   10/20/19 123/64   03/08/17 (!) 160/70     Pulse Readings from Last 3 Encounters:   11/02/19 78   10/20/19 68   03/08/17 76       General appearance: He is awake and alert and feels well  Skin: Color-sallow, texture, turgor normal. No rashes or lesions. Head: Normocephalic. No masses, lesions, tenderness or abnormalities   Face: Symmetric no visible lesions  Eyes: Conjunctivae/cornea clear. [place]. Sclera non icteric. Ears: External appearance normal.  Hearing grossly reduced  Mouth: Lips and tongue appear normal.   Neck:  Symmetric. No adenopathy. Lungs: Clear to auscultation. No rhonchi, crackles or rales. Heart: S1 > S2. Rhythm is regular and rate is normal. No gallop rub or murmur. Abdomen: Soft, there is a suspected mass in the epigastrium and across over the liver this is discrete not been noted on previous examinations. However on past examinations his abdomen has always been tense  Extremities: No deformities, edema, or skin discoloration. Peripheral perfusion assessed in all exremities. No cyanosis  Musculoskeletal: No unusual pain or swelling. Muscular strength intact. Neuro:   · Cranial nerves grossly intact.    · The rest is difficult to assess because he has been given sedation  Mental status: Sedated  Mood: Sedated  Gait & balance: Normally ambulatory independently     Labs     CBC:   Lab Results   Component Value Date    WBC 5.1 11/01/2019    RBC 3.15 11/01/2019    HGB 9.8 11/01/2019    HCT 30.4 11/01/2019     11/01/2019    MCV 96.5 11/01/2019     BMP:    Lab Results   Component Value Date     11/01/2019    K 4.0 11/01/2019     11/01/2019    CO2 27 11/01/2019    BUN 12 11/01/2019    CREATININE 0.8 11/01/2019    GLUCOSE 93 11/01/2019    GLUCOSE 93 06/05/2012    CALCIUM 9.7 11/01/2019     Hepatic Function Panel:    Lab Results   Component Value Date    ALKPHOS 96 11/01/2019    AST 28 11/01/2019    ALT 14 11/01/2019    PROT 8.4 11/01/2019    LABALBU 4.3 11/01/2019    LABALBU 4.3 06/05/2012    BILITOT 0.5 11/01/2019     Magnesium:    Lab Results   Component Value Date    MG 1.9 10/19/2019     Cardiac Enzymes:   Lab Results   Component Value Date    CKTOTAL 176 10/19/2019    CKTOTAL 74 03/31/2011    CKMB 1.1 03/31/2011    TROPONINI <0.01 11/01/2019    TROPONINI <0.01 10/19/2019    TROPONINI <0.01 03/31/2011     LDH:  No results found for: LDH  PT/INR:    Lab Results   Component Value Date    PROTIME 11.7 10/19/2019    PROTIME 11.6 03/31/2011    INR 1.1 10/19/2019     BNP: No results for input(s): BNP in the last 72 hours.    TSH: No results found for: TSH   Cardiac Injury Profile:   Recent Labs     11/01/19  0215   TROPONINI <0.01      Lipid Profile:   Lab Results   Component Value Date    TRIG 92 01/27/2014    HDL 55 01/27/2014    LDLCALC 81 01/27/2014    CHOL 154 01/27/2014      Hemoglobin A1C: No components found for: HGBA1C   U/A:   Lab Results   Component Value Date    LEUKOCYTESUR Negative 11/01/2019    PHUR 6.5 11/01/2019    WBCUA 0-1 10/19/2019    RBCUA NONE 10/19/2019    BACTERIA RARE 10/19/2019    SPECGRAV <=1.005 11/01/2019    BLOODU Negative 11/01/2019    GLUCOSEU Negative 11/01/2019         ADMISSION SCHEDULED MEDS:   Current Facility-Administered Medications   Medication Dose Route Frequency Provider Last Rate Last Dose    therapeutic multivitamin-minerals 1 tablet  1 tablet Oral Daily Bart Peppers, MD   1 tablet at 11/02/19 0958    sodium chloride flush 0.9 % injection skilled facility            See  Orders  Sg Izquierdo MD, Madelaine Santacruz American Board of Internal Medicine  Diplomate, Geriatric Medicine, [address] [place] [address] Rd., [address] of Internal Medicine  10:55 AM  11/2/2019

## 2019-11-03 VITALS
HEART RATE: 75 BPM | RESPIRATION RATE: 16 BRPM | TEMPERATURE: 98.3 F | SYSTOLIC BLOOD PRESSURE: 144 MMHG | DIASTOLIC BLOOD PRESSURE: 73 MMHG | WEIGHT: 101.38 LBS | HEIGHT: 66 IN | OXYGEN SATURATION: 96 % | BODY MASS INDEX: 16.29 KG/M2

## 2019-11-03 PROCEDURE — 6360000002 HC RX W HCPCS: Performed by: INTERNAL MEDICINE

## 2019-11-03 PROCEDURE — 6370000000 HC RX 637 (ALT 250 FOR IP): Performed by: INTERNAL MEDICINE

## 2019-11-03 PROCEDURE — 96372 THER/PROPH/DIAG INJ SC/IM: CPT

## 2019-11-03 PROCEDURE — 2580000003 HC RX 258: Performed by: INTERNAL MEDICINE

## 2019-11-03 RX ORDER — CHOLECALCIFEROL (VITAMIN D3) 125 MCG
500 CAPSULE ORAL DAILY
Qty: 30 TABLET | Refills: 5 | Status: ON HOLD | OUTPATIENT
Start: 2019-11-03 | End: 2021-01-01

## 2019-11-03 RX ORDER — MEMANTINE HYDROCHLORIDE 5 MG/1
5 TABLET ORAL DAILY
Qty: 60 TABLET | Refills: 3 | Status: ON HOLD | OUTPATIENT
Start: 2019-11-04 | End: 2021-01-01

## 2019-11-03 RX ORDER — M-VIT,TX,IRON,MINS/CALC/FOLIC 27MG-0.4MG
1 TABLET ORAL DAILY
Qty: 30 TABLET | Refills: 5 | Status: ON HOLD | OUTPATIENT
Start: 2019-11-04 | End: 2021-01-01

## 2019-11-03 RX ORDER — HYDROCODONE/ACETAMINOPHEN 5 MG-500MG
1 TABLET ORAL DAILY
Qty: 30 CAPSULE | Refills: 5 | Status: ON HOLD | OUTPATIENT
Start: 2019-11-03 | End: 2021-01-01

## 2019-11-03 RX ORDER — TRAZODONE HYDROCHLORIDE 50 MG/1
25 TABLET ORAL NIGHTLY
Qty: 30 TABLET | Refills: 5 | Status: SHIPPED | OUTPATIENT
Start: 2019-11-03

## 2019-11-03 RX ADMIN — ENOXAPARIN SODIUM 40 MG: 40 INJECTION SUBCUTANEOUS at 09:08

## 2019-11-03 RX ADMIN — MULTIPLE VITAMINS W/ MINERALS TAB 1 TABLET: TAB at 09:07

## 2019-11-03 RX ADMIN — Medication 10 ML: at 09:08

## 2019-11-03 RX ADMIN — MEMANTINE HYDROCHLORIDE 5 MG: 5 TABLET, FILM COATED ORAL at 09:07

## 2019-11-03 ASSESSMENT — PAIN SCALES - GENERAL: PAINLEVEL_OUTOF10: 0

## 2019-11-03 NOTE — DISCHARGE SUMMARY
Additional info: Alt mental state TECHNIQUE: Imaging protocol: Computed tomography of the head without contrast. Radiation optimization: All CT scans at this facility use at least one of these dose optimization techniques: automated exposure control; mA and/or kV adjustment per patient size (includes targeted exams where dose is matched to clinical indication); or iterative reconstruction. COMPARISON: CT HEAD WO CONTRAST 10/19/2019 6:53 AM FINDINGS:  BRAIN: Patchy hypodensities in subcortical white matter tracts of hemispheres consistent with chronic microvascular infarcts/ischemic changes. .Negative for acute hemorrhage or extra axial collections. VENTRICLES SULCI, FISSURES, BASAL CISTERNS: Moderate ventriculomegaly with proportional temporal horns. Moderately enlarged sulci in the cerebral hemispheres. VASCULATURE: Atherosclerotic calcifications of internal carotid arteries . PARANASAL SINUSES: The visualized paranasal sinuses are well-developed and aerated. CALVARIUM /SKULL BASE /FACIAL BONES: Bony sella and visualized foramina appear normal. No destructive lesions. Negative for fractures. MASTOID AIR CELLS: Pneumatized. ORBITS: No abnormality in visualized bilateral orbits. EXTRACRANIAL SOFT TISSUES: Scattered tiny bubbles of gas adjacent to the facial bones and the skull base are probably intravenous. 1. Chronic microvascular infarcts and mild generalized volume loss. 2. Negative for acute intracranial pathology. 3. Scattered tiny bubbles of gas adjacent to the facial bones and the skull base, most likely intravenous and likely iatrogenic.  This report has been electronically signed by Simon Marquez MD.    Xr Chest 1 Vw    Result Date: 2019  Patient MRN: [id number] : 2/10/1927 Age:  80 years Gender: Male Order Date: 2019 2:15 AM Exam: XR CHEST 1 VIEW Number of Images: 1 view Indication:   alt ms alt ms Comparison: 2019 FINDINGS: Heart and pulmonary vascularity normal. 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 There are increased markings particularly at the right base which are thought related to scarring as well and are also long-standing. Calcified pleural plaques pleural-parenchymal scarring. Us Abdomen Limited    Result Date: 2019  Patient MRN: [id number] : 2/10/1927 Age:  80 years Gender: Male Order Date: 2019 11:00 AM Exam: [facility] Number of Images: 52 images Indication:   Suspected epigastric right upper quadrant mass Comparison: 2013 CT chest. Findings: Sonographic examination of the right upper abdomen shows a normal sized gallbladder containing multiple dependent echogenic calculi with associated acoustic shadowing. These were also present on the lower images of the CT scan of the chest on 2013. There is no thickening of the gallbladder wall or pericholecystic fluid. There was a negative Davila's sign. A portion of the common bile duct was demonstrated, measuring up to 3 mm in diameter. The liver is normal in size, measuring 11.2 cm in sagittal length. There is no focal lesion or intrahepatic biliary dilatation. The visualized pancreas shows no enlargement or mass lesion. The right kidney is somewhat small, measuring 8.3 x 4.3 x 4.3 cm in diameter. There is no mass, calculi, or hydronephrosis. The visualized upper portions of the abdominal aorta and inferior vena cava appear normal. There is no apparent free fluid. Cholelithiasis, also present on a CT scan of the chest on 2013. No evidence of cholecystitis. Small right kidney. Otherwise normal study. Hospital Course:  Patient recently discharged for the same symptoms. The patient had been on 20 mg of paroxetine. This was stopped and he resolved.   However then he was not sleeping. He came to the office and was doing reasonably well with no more hallucinations. He opted to try 7.5 mg of mirtazapine. He started having difficulty with that so it was stopped the same reason. Then he was unable to sleep and not slept for 24 hours. Then he began demonstrating paranoid behavior and had visual hallucinations. He has a history of dementia.     He was seen this morning with his daughter and the history was confirmed     11/2  After some careful hydration and start of trazodone and Namenda patient is awake and alert and feels well  Daughter is at bedside there have been no more episodes of hallucination or behaviors  He states he is quite comfortable  The ultrasound of the abdomen is negative  The patient has no symptoms  Discussed with daughter and the need for further evaluation is overshadowed by the patient's age and general condition. At this point CT scan will not be performed    After multiple discussions with the daughter the patient and the daughter wish to go home. Since the alteration in the medication once again he feels well  He is alert and oriented with only mild confusion  He is independently ambulatory    Discharge Medications: Yvan Corry   Home Medication Instructions CWD:[id number]    Printed on:11/03/19 3697   Medication Information                      Lutein 6 MG CAPS  Take 1 capsule by mouth daily             memantine (NAMENDA) 5 MG tablet  Take 1 tablet by mouth daily             Multiple Vitamins-Minerals (THERAPEUTIC MULTIVITAMIN-MINERALS) tablet  Take 1 tablet by mouth daily             traZODone (DESYREL) 50 MG tablet  Take 0.5 tablets by mouth nightly             vitamin B-12 (CYANOCOBALAMIN) 500 MCG tablet  Take 1 tablet by mouth daily                 Discharge Exam:  General appearance: He is awake and alert and feels well  Skin: Color-sallow, texture, turgor normal. No rashes or lesions. Head: Normocephalic.  No masses, lesions, tenderness or abnormalities   Face: Symmetric no visible lesions  Eyes: Conjunctivae/cornea clear. Marta Running. Sclera non icteric. Ears: External appearance normal.  Hearing grossly reduced  Mouth: Lips and tongue appear normal.   Neck:  Symmetric. No adenopathy. Lungs: Clear to auscultation. No rhonchi, crackles or rales. Heart: S1 > S2. Rhythm is regular and rate is normal. No gallop rub or murmur. Abdomen: Soft, musculature is relaxed there are no masses noted and he is nontender bowel sounds are normal  Extremities: No deformities, edema, or skin discoloration. Peripheral perfusion assessed in all exremities. No cyanosis  Musculoskeletal: No unusual pain or swelling. Muscular strength intact. Neuro:   · Cranial nerves grossly intact.    · There is no focal neurologic sign  Mental status: Awake alert interactive-states he feels well  Gait & balance: Normally ambulatory independently           Disposition: home    Patient Instructions:   REFER TO AVR document    Please note the medication revision on the AVR patient will follow-up in approximately 1 week    Signed:  Ronni Mina  [facility]  [employer]  11/3/2019, 9:16 AM

## 2021-01-01 ENCOUNTER — APPOINTMENT (OUTPATIENT)
Dept: GENERAL RADIOLOGY | Age: 86
DRG: 871 | End: 2021-01-01
Payer: MEDICARE

## 2021-01-01 ENCOUNTER — APPOINTMENT (OUTPATIENT)
Dept: CT IMAGING | Age: 86
DRG: 871 | End: 2021-01-01
Payer: MEDICARE

## 2021-01-01 ENCOUNTER — HOSPITAL ENCOUNTER (INPATIENT)
Age: 86
LOS: 4 days | Discharge: HOSPICE | DRG: 871 | End: 2021-08-20
Attending: EMERGENCY MEDICINE | Admitting: FAMILY MEDICINE
Payer: MEDICARE

## 2021-01-01 VITALS
OXYGEN SATURATION: 100 % | WEIGHT: 87.9 LBS | DIASTOLIC BLOOD PRESSURE: 70 MMHG | HEART RATE: 76 BPM | TEMPERATURE: 98 F | SYSTOLIC BLOOD PRESSURE: 142 MMHG | BODY MASS INDEX: 14.13 KG/M2 | HEIGHT: 66 IN | RESPIRATION RATE: 17 BRPM

## 2021-01-01 DIAGNOSIS — J18.9 PNEUMONIA OF RIGHT MIDDLE LOBE DUE TO INFECTIOUS ORGANISM: Primary | ICD-10-CM

## 2021-01-01 LAB
ALBUMIN SERPL-MCNC: 3.6 G/DL (ref 3.5–5.2)
ALP BLD-CCNC: 150 U/L (ref 40–129)
ALT SERPL-CCNC: 480 U/L (ref 0–40)
ANION GAP SERPL CALCULATED.3IONS-SCNC: 11 MMOL/L (ref 7–16)
ANION GAP SERPL CALCULATED.3IONS-SCNC: 6 MMOL/L (ref 7–16)
ANION GAP SERPL CALCULATED.3IONS-SCNC: 8 MMOL/L (ref 7–16)
AST SERPL-CCNC: 1112 U/L (ref 0–39)
BACTERIA: ABNORMAL /HPF
BASOPHILIC STIPPLING: ABNORMAL
BASOPHILS ABSOLUTE: 0 E9/L (ref 0–0.2)
BASOPHILS ABSOLUTE: 0.01 E9/L (ref 0–0.2)
BASOPHILS RELATIVE PERCENT: 0.1 % (ref 0–2)
BASOPHILS RELATIVE PERCENT: 0.2 % (ref 0–2)
BILIRUB SERPL-MCNC: 0.5 MG/DL (ref 0–1.2)
BILIRUBIN URINE: NEGATIVE
BLOOD CULTURE, ROUTINE: NORMAL
BLOOD, URINE: NEGATIVE
BUN BLDV-MCNC: 17 MG/DL (ref 6–23)
BUN BLDV-MCNC: 23 MG/DL (ref 6–23)
BUN BLDV-MCNC: 49 MG/DL (ref 6–23)
CALCIUM IONIZED: 1.13 MMOL/L (ref 1.15–1.33)
CALCIUM SERPL-MCNC: 5.2 MG/DL (ref 8.6–10.2)
CALCIUM SERPL-MCNC: 8.4 MG/DL (ref 8.6–10.2)
CALCIUM SERPL-MCNC: 8.6 MG/DL (ref 8.6–10.2)
CHLORIDE BLD-SCNC: 101 MMOL/L (ref 98–107)
CHLORIDE BLD-SCNC: 113 MMOL/L (ref 98–107)
CHLORIDE BLD-SCNC: 99 MMOL/L (ref 98–107)
CLARITY: CLEAR
CO2: 20 MMOL/L (ref 22–29)
CO2: 29 MMOL/L (ref 22–29)
CO2: 29 MMOL/L (ref 22–29)
COLOR: YELLOW
CREAT SERPL-MCNC: 0.5 MG/DL (ref 0.7–1.2)
CREAT SERPL-MCNC: 0.8 MG/DL (ref 0.7–1.2)
CREAT SERPL-MCNC: 1.5 MG/DL (ref 0.7–1.2)
CULTURE, BLOOD 2: NORMAL
EOSINOPHILS ABSOLUTE: 0 E9/L (ref 0.05–0.5)
EOSINOPHILS ABSOLUTE: 0 E9/L (ref 0.05–0.5)
EOSINOPHILS RELATIVE PERCENT: 0 % (ref 0–6)
EOSINOPHILS RELATIVE PERCENT: 1.1 % (ref 0–6)
EPITHELIAL CELLS, UA: ABNORMAL /HPF
GFR AFRICAN AMERICAN: 53
GFR AFRICAN AMERICAN: >60
GFR AFRICAN AMERICAN: >60
GFR NON-AFRICAN AMERICAN: 44 ML/MIN/1.73
GFR NON-AFRICAN AMERICAN: >60 ML/MIN/1.73
GFR NON-AFRICAN AMERICAN: >60 ML/MIN/1.73
GLUCOSE BLD-MCNC: 151 MG/DL (ref 74–99)
GLUCOSE BLD-MCNC: 163 MG/DL (ref 74–99)
GLUCOSE BLD-MCNC: 66 MG/DL (ref 74–99)
GLUCOSE URINE: NEGATIVE MG/DL
HCT VFR BLD CALC: 27.4 % (ref 37–54)
HCT VFR BLD CALC: 31.1 % (ref 37–54)
HCT VFR BLD CALC: 32 % (ref 37–54)
HEMOGLOBIN: 10.3 G/DL (ref 12.5–16.5)
HEMOGLOBIN: 8.7 G/DL (ref 12.5–16.5)
HEMOGLOBIN: 9.7 G/DL (ref 12.5–16.5)
HYPOCHROMIA: ABNORMAL
IMMATURE GRANULOCYTES #: 0.01 E9/L
IMMATURE GRANULOCYTES %: 0.1 % (ref 0–5)
KETONES, URINE: NEGATIVE MG/DL
L. PNEUMOPHILA SEROGP 1 UR AG: NORMAL
LACTIC ACID: 1.8 MMOL/L (ref 0.5–2.2)
LEUKOCYTE ESTERASE, URINE: NEGATIVE
LYMPHOCYTES ABSOLUTE: 0.19 E9/L (ref 1.5–4)
LYMPHOCYTES ABSOLUTE: 0.25 E9/L (ref 1.5–4)
LYMPHOCYTES RELATIVE PERCENT: 1.7 % (ref 20–42)
LYMPHOCYTES RELATIVE PERCENT: 2.7 % (ref 20–42)
MAGNESIUM: 1 MG/DL (ref 1.6–2.6)
MCH RBC QN AUTO: 30.1 PG (ref 26–35)
MCH RBC QN AUTO: 30.5 PG (ref 26–35)
MCH RBC QN AUTO: 30.6 PG (ref 26–35)
MCHC RBC AUTO-ENTMCNC: 31.2 % (ref 32–34.5)
MCHC RBC AUTO-ENTMCNC: 31.8 % (ref 32–34.5)
MCHC RBC AUTO-ENTMCNC: 32.2 % (ref 32–34.5)
MCV RBC AUTO: 94.7 FL (ref 80–99.9)
MCV RBC AUTO: 94.8 FL (ref 80–99.9)
MCV RBC AUTO: 98.1 FL (ref 80–99.9)
MONOCYTES ABSOLUTE: 0.5 E9/L (ref 0.1–0.95)
MONOCYTES ABSOLUTE: 1.11 E9/L (ref 0.1–0.95)
MONOCYTES RELATIVE PERCENT: 7 % (ref 2–12)
MONOCYTES RELATIVE PERCENT: 8.7 % (ref 2–12)
NEUTROPHILS ABSOLUTE: 11.07 E9/L (ref 1.8–7.3)
NEUTROPHILS ABSOLUTE: 6.4 E9/L (ref 1.8–7.3)
NEUTROPHILS RELATIVE PERCENT: 89.6 % (ref 43–80)
NEUTROPHILS RELATIVE PERCENT: 90.1 % (ref 43–80)
NITRITE, URINE: NEGATIVE
OVALOCYTES: ABNORMAL
OVALOCYTES: ABNORMAL
PDW BLD-RTO: 12.7 FL (ref 11.5–15)
PDW BLD-RTO: 12.8 FL (ref 11.5–15)
PDW BLD-RTO: 13.5 FL (ref 11.5–15)
PH UA: 5.5 (ref 5–9)
PLATELET # BLD: 143 E9/L (ref 130–450)
PLATELET # BLD: 159 E9/L (ref 130–450)
PLATELET # BLD: 187 E9/L (ref 130–450)
PMV BLD AUTO: 10.1 FL (ref 7–12)
PMV BLD AUTO: 10.3 FL (ref 7–12)
PMV BLD AUTO: 10.8 FL (ref 7–12)
POIKILOCYTES: ABNORMAL
POIKILOCYTES: ABNORMAL
POLYCHROMASIA: ABNORMAL
POTASSIUM REFLEX MAGNESIUM: 3 MMOL/L (ref 3.5–5)
POTASSIUM SERPL-SCNC: 4 MMOL/L (ref 3.5–5)
POTASSIUM SERPL-SCNC: 4.2 MMOL/L (ref 3.5–5)
PROTEIN UA: ABNORMAL MG/DL
RBC # BLD: 2.89 E12/L (ref 3.8–5.8)
RBC # BLD: 3.17 E12/L (ref 3.8–5.8)
RBC # BLD: 3.38 E12/L (ref 3.8–5.8)
RBC UA: ABNORMAL /HPF (ref 0–2)
SARS-COV-2, NAAT: NOT DETECTED
SODIUM BLD-SCNC: 138 MMOL/L (ref 132–146)
SODIUM BLD-SCNC: 139 MMOL/L (ref 132–146)
SODIUM BLD-SCNC: 139 MMOL/L (ref 132–146)
SPECIFIC GRAVITY UA: 1.02 (ref 1–1.03)
STREP PNEUMONIAE ANTIGEN, URINE: NORMAL
TOTAL PROTEIN: 6.8 G/DL (ref 6.4–8.3)
TROPONIN, HIGH SENSITIVITY: 102 NG/L (ref 0–11)
TROPONIN, HIGH SENSITIVITY: 79 NG/L (ref 0–11)
TROPONIN, HIGH SENSITIVITY: 99 NG/L (ref 0–11)
URINE CULTURE, ROUTINE: NORMAL
UROBILINOGEN, URINE: 0.2 E.U./DL
WBC # BLD: 11.9 E9/L (ref 4.5–11.5)
WBC # BLD: 12.3 E9/L (ref 4.5–11.5)
WBC # BLD: 7.1 E9/L (ref 4.5–11.5)
WBC UA: ABNORMAL /HPF (ref 0–5)

## 2021-01-01 PROCEDURE — 84484 ASSAY OF TROPONIN QUANT: CPT

## 2021-01-01 PROCEDURE — 6370000000 HC RX 637 (ALT 250 FOR IP): Performed by: FAMILY MEDICINE

## 2021-01-01 PROCEDURE — 2500000003 HC RX 250 WO HCPCS: Performed by: FAMILY MEDICINE

## 2021-01-01 PROCEDURE — 99222 1ST HOSP IP/OBS MODERATE 55: CPT | Performed by: INTERNAL MEDICINE

## 2021-01-01 PROCEDURE — 85025 COMPLETE CBC W/AUTO DIFF WBC: CPT

## 2021-01-01 PROCEDURE — 92610 EVALUATE SWALLOWING FUNCTION: CPT | Performed by: SPEECH-LANGUAGE PATHOLOGIST

## 2021-01-01 PROCEDURE — 87040 BLOOD CULTURE FOR BACTERIA: CPT

## 2021-01-01 PROCEDURE — 71045 X-RAY EXAM CHEST 1 VIEW: CPT

## 2021-01-01 PROCEDURE — 2060000000 HC ICU INTERMEDIATE R&B

## 2021-01-01 PROCEDURE — 2700000000 HC OXYGEN THERAPY PER DAY

## 2021-01-01 PROCEDURE — 2580000003 HC RX 258: Performed by: SPECIALIST

## 2021-01-01 PROCEDURE — 36415 COLL VENOUS BLD VENIPUNCTURE: CPT

## 2021-01-01 PROCEDURE — 94664 DEMO&/EVAL PT USE INHALER: CPT

## 2021-01-01 PROCEDURE — 6360000004 HC RX CONTRAST MEDICATION: Performed by: RADIOLOGY

## 2021-01-01 PROCEDURE — 70450 CT HEAD/BRAIN W/O DYE: CPT

## 2021-01-01 PROCEDURE — 71046 X-RAY EXAM CHEST 2 VIEWS: CPT

## 2021-01-01 PROCEDURE — 2580000003 HC RX 258: Performed by: EMERGENCY MEDICINE

## 2021-01-01 PROCEDURE — 92526 ORAL FUNCTION THERAPY: CPT | Performed by: SPEECH-LANGUAGE PATHOLOGIST

## 2021-01-01 PROCEDURE — 83605 ASSAY OF LACTIC ACID: CPT

## 2021-01-01 PROCEDURE — 2580000003 HC RX 258: Performed by: FAMILY MEDICINE

## 2021-01-01 PROCEDURE — 6360000002 HC RX W HCPCS: Performed by: FAMILY MEDICINE

## 2021-01-01 PROCEDURE — 6360000002 HC RX W HCPCS: Performed by: INTERNAL MEDICINE

## 2021-01-01 PROCEDURE — 6360000002 HC RX W HCPCS: Performed by: SPECIALIST

## 2021-01-01 PROCEDURE — 99222 1ST HOSP IP/OBS MODERATE 55: CPT | Performed by: FAMILY MEDICINE

## 2021-01-01 PROCEDURE — 85027 COMPLETE CBC AUTOMATED: CPT

## 2021-01-01 PROCEDURE — 6370000000 HC RX 637 (ALT 250 FOR IP): Performed by: EMERGENCY MEDICINE

## 2021-01-01 PROCEDURE — 83735 ASSAY OF MAGNESIUM: CPT

## 2021-01-01 PROCEDURE — 82330 ASSAY OF CALCIUM: CPT

## 2021-01-01 PROCEDURE — 99285 EMERGENCY DEPT VISIT HI MDM: CPT

## 2021-01-01 PROCEDURE — 2580000003 HC RX 258: Performed by: INTERNAL MEDICINE

## 2021-01-01 PROCEDURE — 94640 AIRWAY INHALATION TREATMENT: CPT

## 2021-01-01 PROCEDURE — 6360000002 HC RX W HCPCS: Performed by: EMERGENCY MEDICINE

## 2021-01-01 PROCEDURE — 80053 COMPREHEN METABOLIC PANEL: CPT

## 2021-01-01 PROCEDURE — 96365 THER/PROPH/DIAG IV INF INIT: CPT

## 2021-01-01 PROCEDURE — 71275 CT ANGIOGRAPHY CHEST: CPT

## 2021-01-01 PROCEDURE — 81001 URINALYSIS AUTO W/SCOPE: CPT

## 2021-01-01 PROCEDURE — 6370000000 HC RX 637 (ALT 250 FOR IP): Performed by: INTERNAL MEDICINE

## 2021-01-01 PROCEDURE — APPSS60 APP SPLIT SHARED TIME 46-60 MINUTES: Performed by: CLINICAL NURSE SPECIALIST

## 2021-01-01 PROCEDURE — 87635 SARS-COV-2 COVID-19 AMP PRB: CPT

## 2021-01-01 PROCEDURE — 87449 NOS EACH ORGANISM AG IA: CPT

## 2021-01-01 PROCEDURE — 80048 BASIC METABOLIC PNL TOTAL CA: CPT

## 2021-01-01 PROCEDURE — 87088 URINE BACTERIA CULTURE: CPT

## 2021-01-01 RX ORDER — ONDANSETRON 4 MG/1
4 TABLET, ORALLY DISINTEGRATING ORAL EVERY 8 HOURS PRN
Status: DISCONTINUED | OUTPATIENT
Start: 2021-01-01 | End: 2021-01-01 | Stop reason: HOSPADM

## 2021-01-01 RX ORDER — AMOXICILLIN AND CLAVULANATE POTASSIUM 250; 62.5 MG/5ML; MG/5ML
500 POWDER, FOR SUSPENSION ORAL 2 TIMES DAILY
Qty: 100 ML | Refills: 0 | Status: SHIPPED | OUTPATIENT
Start: 2021-01-01 | End: 2021-01-01 | Stop reason: SDUPTHER

## 2021-01-01 RX ORDER — SODIUM CHLORIDE 9 MG/ML
INJECTION, SOLUTION INTRAVENOUS CONTINUOUS
Status: DISCONTINUED | OUTPATIENT
Start: 2021-01-01 | End: 2021-01-01 | Stop reason: HOSPADM

## 2021-01-01 RX ORDER — MAGNESIUM SULFATE IN WATER 40 MG/ML
4000 INJECTION, SOLUTION INTRAVENOUS ONCE
Status: COMPLETED | OUTPATIENT
Start: 2021-01-01 | End: 2021-01-01

## 2021-01-01 RX ORDER — GLYCOPYRROLATE 1 MG/1
1 TABLET ORAL 3 TIMES DAILY PRN
Qty: 90 TABLET | Refills: 3 | Status: SHIPPED | OUTPATIENT
Start: 2021-01-01 | End: 2021-01-01

## 2021-01-01 RX ORDER — ALBUTEROL SULFATE 2.5 MG/3ML
2.5 SOLUTION RESPIRATORY (INHALATION) 4 TIMES DAILY
Status: DISCONTINUED | OUTPATIENT
Start: 2021-01-01 | End: 2021-01-01 | Stop reason: HOSPADM

## 2021-01-01 RX ORDER — MORPHINE SULFATE 20 MG/ML
2.5 SOLUTION ORAL
Qty: 30 ML | Refills: 0 | Status: SHIPPED | OUTPATIENT
Start: 2021-01-01 | End: 2021-01-01

## 2021-01-01 RX ORDER — LORAZEPAM 0.5 MG/1
0.5 TABLET ORAL EVERY 4 HOURS PRN
Qty: 15 TABLET | Refills: 0 | Status: SHIPPED | OUTPATIENT
Start: 2021-01-01 | End: 2021-01-01

## 2021-01-01 RX ORDER — MEMANTINE HYDROCHLORIDE 5 MG/1
5 TABLET ORAL DAILY
Status: DISCONTINUED | OUTPATIENT
Start: 2021-01-01 | End: 2021-01-01 | Stop reason: HOSPADM

## 2021-01-01 RX ORDER — DOXYCYCLINE HYCLATE 100 MG/1
100 CAPSULE ORAL ONCE
Status: COMPLETED | OUTPATIENT
Start: 2021-01-01 | End: 2021-01-01

## 2021-01-01 RX ORDER — ACETAMINOPHEN 325 MG/1
650 TABLET ORAL EVERY 6 HOURS PRN
Status: DISCONTINUED | OUTPATIENT
Start: 2021-01-01 | End: 2021-01-01 | Stop reason: HOSPADM

## 2021-01-01 RX ORDER — MEMANTINE HYDROCHLORIDE 5 MG/1
5 TABLET ORAL DAILY
Qty: 60 TABLET | Refills: 3 | Status: SHIPPED | OUTPATIENT
Start: 2021-01-01

## 2021-01-01 RX ORDER — SODIUM CHLORIDE 9 MG/ML
INJECTION, SOLUTION INTRAVENOUS CONTINUOUS
Status: ACTIVE | OUTPATIENT
Start: 2021-01-01 | End: 2021-01-01

## 2021-01-01 RX ORDER — SODIUM CHLORIDE 0.9 % (FLUSH) 0.9 %
5-40 SYRINGE (ML) INJECTION EVERY 12 HOURS SCHEDULED
Status: DISCONTINUED | OUTPATIENT
Start: 2021-01-01 | End: 2021-01-01 | Stop reason: HOSPADM

## 2021-01-01 RX ORDER — M-VIT,TX,IRON,MINS/CALC/FOLIC 27MG-0.4MG
1 TABLET ORAL DAILY
Status: DISCONTINUED | OUTPATIENT
Start: 2021-01-01 | End: 2021-01-01 | Stop reason: HOSPADM

## 2021-01-01 RX ORDER — 0.9 % SODIUM CHLORIDE 0.9 %
500 INTRAVENOUS SOLUTION INTRAVENOUS ONCE
Status: COMPLETED | OUTPATIENT
Start: 2021-01-01 | End: 2021-01-01

## 2021-01-01 RX ORDER — ACETAMINOPHEN 325 MG/1
650 TABLET ORAL ONCE
Status: COMPLETED | OUTPATIENT
Start: 2021-01-01 | End: 2021-01-01

## 2021-01-01 RX ORDER — ACETAMINOPHEN 650 MG/1
650 SUPPOSITORY RECTAL EVERY 6 HOURS PRN
Status: DISCONTINUED | OUTPATIENT
Start: 2021-01-01 | End: 2021-01-01 | Stop reason: HOSPADM

## 2021-01-01 RX ORDER — POLYETHYLENE GLYCOL 3350 17 G/17G
17 POWDER, FOR SOLUTION ORAL DAILY PRN
Status: DISCONTINUED | OUTPATIENT
Start: 2021-01-01 | End: 2021-01-01 | Stop reason: HOSPADM

## 2021-01-01 RX ORDER — ONDANSETRON 2 MG/ML
4 INJECTION INTRAMUSCULAR; INTRAVENOUS EVERY 6 HOURS PRN
Status: DISCONTINUED | OUTPATIENT
Start: 2021-01-01 | End: 2021-01-01 | Stop reason: HOSPADM

## 2021-01-01 RX ORDER — AMOXICILLIN AND CLAVULANATE POTASSIUM 250; 62.5 MG/5ML; MG/5ML
500 POWDER, FOR SUSPENSION ORAL 2 TIMES DAILY
Qty: 100 ML | Refills: 0 | Status: SHIPPED | OUTPATIENT
Start: 2021-01-01 | End: 2021-01-01

## 2021-01-01 RX ORDER — LANOLIN ALCOHOL/MO/W.PET/CERES
500 CREAM (GRAM) TOPICAL DAILY
Status: DISCONTINUED | OUTPATIENT
Start: 2021-01-01 | End: 2021-01-01 | Stop reason: HOSPADM

## 2021-01-01 RX ORDER — GLYCOPYRROLATE 1 MG/1
1 TABLET ORAL 3 TIMES DAILY PRN
Qty: 90 TABLET | Refills: 3 | Status: SHIPPED | OUTPATIENT
Start: 2021-01-01

## 2021-01-01 RX ORDER — SODIUM CHLORIDE 9 MG/ML
25 INJECTION, SOLUTION INTRAVENOUS PRN
Status: DISCONTINUED | OUTPATIENT
Start: 2021-01-01 | End: 2021-01-01 | Stop reason: HOSPADM

## 2021-01-01 RX ORDER — MORPHINE SULFATE 20 MG/ML
2.5 SOLUTION ORAL
Status: DISCONTINUED | OUTPATIENT
Start: 2021-01-01 | End: 2021-01-01 | Stop reason: HOSPADM

## 2021-01-01 RX ORDER — GLYCOPYRROLATE 1 MG/1
1 TABLET ORAL 3 TIMES DAILY PRN
Status: DISCONTINUED | OUTPATIENT
Start: 2021-01-01 | End: 2021-01-01 | Stop reason: HOSPADM

## 2021-01-01 RX ORDER — 0.9 % SODIUM CHLORIDE 0.9 %
250 INTRAVENOUS SOLUTION INTRAVENOUS ONCE
Status: COMPLETED | OUTPATIENT
Start: 2021-01-01 | End: 2021-01-01

## 2021-01-01 RX ORDER — SODIUM CHLORIDE 0.9 % (FLUSH) 0.9 %
5-40 SYRINGE (ML) INJECTION PRN
Status: DISCONTINUED | OUTPATIENT
Start: 2021-01-01 | End: 2021-01-01 | Stop reason: HOSPADM

## 2021-01-01 RX ORDER — LORAZEPAM 0.5 MG/1
0.5 TABLET ORAL EVERY 4 HOURS PRN
Status: DISCONTINUED | OUTPATIENT
Start: 2021-01-01 | End: 2021-01-01 | Stop reason: HOSPADM

## 2021-01-01 RX ADMIN — DOXYCYCLINE 100 MG: 100 INJECTION, POWDER, LYOPHILIZED, FOR SOLUTION INTRAVENOUS at 09:25

## 2021-01-01 RX ADMIN — MEMANTINE HYDROCHLORIDE 5 MG: 5 TABLET, FILM COATED ORAL at 09:12

## 2021-01-01 RX ADMIN — ALBUTEROL SULFATE 2.5 MG: 2.5 SOLUTION RESPIRATORY (INHALATION) at 09:13

## 2021-01-01 RX ADMIN — WATER 1000 MG: 1 INJECTION INTRAMUSCULAR; INTRAVENOUS; SUBCUTANEOUS at 21:51

## 2021-01-01 RX ADMIN — Medication 500 MCG: at 13:23

## 2021-01-01 RX ADMIN — ALBUTEROL SULFATE 2.5 MG: 2.5 SOLUTION RESPIRATORY (INHALATION) at 16:12

## 2021-01-01 RX ADMIN — SODIUM CHLORIDE, PRESERVATIVE FREE 10 ML: 5 INJECTION INTRAVENOUS at 21:54

## 2021-01-01 RX ADMIN — SODIUM CHLORIDE, PRESERVATIVE FREE 10 ML: 5 INJECTION INTRAVENOUS at 09:25

## 2021-01-01 RX ADMIN — ENOXAPARIN SODIUM 40 MG: 40 INJECTION SUBCUTANEOUS at 09:50

## 2021-01-01 RX ADMIN — ENOXAPARIN SODIUM 40 MG: 40 INJECTION SUBCUTANEOUS at 08:37

## 2021-01-01 RX ADMIN — POTASSIUM BICARBONATE 40 MEQ: 782 TABLET, EFFERVESCENT ORAL at 21:01

## 2021-01-01 RX ADMIN — DOXYCYCLINE 100 MG: 100 INJECTION, POWDER, LYOPHILIZED, FOR SOLUTION INTRAVENOUS at 21:54

## 2021-01-01 RX ADMIN — MEMANTINE HYDROCHLORIDE 5 MG: 5 TABLET, FILM COATED ORAL at 11:00

## 2021-01-01 RX ADMIN — ACETAMINOPHEN 650 MG: 325 TABLET ORAL at 21:13

## 2021-01-01 RX ADMIN — Medication 1 TABLET: at 09:51

## 2021-01-01 RX ADMIN — SODIUM CHLORIDE, PRESERVATIVE FREE 10 ML: 5 INJECTION INTRAVENOUS at 21:00

## 2021-01-01 RX ADMIN — WATER 1000 MG: 1 INJECTION INTRAMUSCULAR; INTRAVENOUS; SUBCUTANEOUS at 22:10

## 2021-01-01 RX ADMIN — SODIUM CHLORIDE 250 ML: 9 INJECTION, SOLUTION INTRAVENOUS at 20:59

## 2021-01-01 RX ADMIN — ALBUTEROL SULFATE 2.5 MG: 2.5 SOLUTION RESPIRATORY (INHALATION) at 15:54

## 2021-01-01 RX ADMIN — SODIUM CHLORIDE: 9 INJECTION, SOLUTION INTRAVENOUS at 06:09

## 2021-01-01 RX ADMIN — ENOXAPARIN SODIUM 40 MG: 40 INJECTION SUBCUTANEOUS at 10:30

## 2021-01-01 RX ADMIN — ALBUTEROL SULFATE 2.5 MG: 2.5 SOLUTION RESPIRATORY (INHALATION) at 20:07

## 2021-01-01 RX ADMIN — SODIUM CHLORIDE, PRESERVATIVE FREE 10 ML: 5 INJECTION INTRAVENOUS at 09:50

## 2021-01-01 RX ADMIN — CEFTRIAXONE SODIUM 2000 MG: 2 INJECTION, POWDER, FOR SOLUTION INTRAMUSCULAR; INTRAVENOUS at 21:42

## 2021-01-01 RX ADMIN — ENOXAPARIN SODIUM 40 MG: 40 INJECTION SUBCUTANEOUS at 10:25

## 2021-01-01 RX ADMIN — DOXYCYCLINE 100 MG: 100 INJECTION, POWDER, LYOPHILIZED, FOR SOLUTION INTRAVENOUS at 09:06

## 2021-01-01 RX ADMIN — MEMANTINE HYDROCHLORIDE 5 MG: 5 TABLET, FILM COATED ORAL at 13:22

## 2021-01-01 RX ADMIN — Medication 500 MCG: at 09:12

## 2021-01-01 RX ADMIN — SODIUM CHLORIDE, PRESERVATIVE FREE 10 ML: 5 INJECTION INTRAVENOUS at 08:37

## 2021-01-01 RX ADMIN — DOXYCYCLINE 100 MG: 100 INJECTION, POWDER, LYOPHILIZED, FOR SOLUTION INTRAVENOUS at 10:25

## 2021-01-01 RX ADMIN — MAGNESIUM SULFATE HEPTAHYDRATE 4000 MG: 40 INJECTION, SOLUTION INTRAVENOUS at 10:30

## 2021-01-01 RX ADMIN — MEMANTINE HYDROCHLORIDE 5 MG: 5 TABLET, FILM COATED ORAL at 09:25

## 2021-01-01 RX ADMIN — SODIUM CHLORIDE, PRESERVATIVE FREE 10 ML: 5 INJECTION INTRAVENOUS at 09:07

## 2021-01-01 RX ADMIN — SODIUM CHLORIDE: 9 INJECTION, SOLUTION INTRAVENOUS at 16:50

## 2021-01-01 RX ADMIN — Medication 1 TABLET: at 09:08

## 2021-01-01 RX ADMIN — Medication 1 TABLET: at 13:23

## 2021-01-01 RX ADMIN — ALBUTEROL SULFATE 2.5 MG: 2.5 SOLUTION RESPIRATORY (INHALATION) at 19:31

## 2021-01-01 RX ADMIN — DOXYCYCLINE 100 MG: 100 INJECTION, POWDER, LYOPHILIZED, FOR SOLUTION INTRAVENOUS at 21:52

## 2021-01-01 RX ADMIN — ALBUTEROL SULFATE 2.5 MG: 2.5 SOLUTION RESPIRATORY (INHALATION) at 09:50

## 2021-01-01 RX ADMIN — Medication 1 TABLET: at 09:25

## 2021-01-01 RX ADMIN — SODIUM CHLORIDE, PRESERVATIVE FREE 10 ML: 5 INJECTION INTRAVENOUS at 21:51

## 2021-01-01 RX ADMIN — POTASSIUM BICARBONATE 40 MEQ: 782 TABLET, EFFERVESCENT ORAL at 11:16

## 2021-01-01 RX ADMIN — ALBUTEROL SULFATE 2.5 MG: 2.5 SOLUTION RESPIRATORY (INHALATION) at 13:49

## 2021-01-01 RX ADMIN — ALBUTEROL SULFATE 2.5 MG: 2.5 SOLUTION RESPIRATORY (INHALATION) at 13:39

## 2021-01-01 RX ADMIN — MEMANTINE HYDROCHLORIDE 5 MG: 5 TABLET, FILM COATED ORAL at 09:51

## 2021-01-01 RX ADMIN — ALBUTEROL SULFATE 2.5 MG: 2.5 SOLUTION RESPIRATORY (INHALATION) at 15:59

## 2021-01-01 RX ADMIN — IOPAMIDOL 75 ML: 755 INJECTION, SOLUTION INTRAVENOUS at 01:37

## 2021-01-01 RX ADMIN — WATER 1000 MG: 1 INJECTION INTRAMUSCULAR; INTRAVENOUS; SUBCUTANEOUS at 21:54

## 2021-01-01 RX ADMIN — DOXYCYCLINE HYCLATE 100 MG: 100 CAPSULE ORAL at 22:10

## 2021-01-01 RX ADMIN — SODIUM CHLORIDE 500 ML: 9 INJECTION, SOLUTION INTRAVENOUS at 12:51

## 2021-01-01 RX ADMIN — ALBUTEROL SULFATE 2.5 MG: 2.5 SOLUTION RESPIRATORY (INHALATION) at 19:13

## 2021-01-01 RX ADMIN — Medication 500 MCG: at 09:25

## 2021-01-01 RX ADMIN — WATER 1000 MG: 1 INJECTION INTRAMUSCULAR; INTRAVENOUS; SUBCUTANEOUS at 22:32

## 2021-01-01 RX ADMIN — DOXYCYCLINE 100 MG: 100 INJECTION, POWDER, LYOPHILIZED, FOR SOLUTION INTRAVENOUS at 22:32

## 2021-01-01 RX ADMIN — ALBUTEROL SULFATE 2.5 MG: 2.5 SOLUTION RESPIRATORY (INHALATION) at 14:15

## 2021-01-01 RX ADMIN — Medication 500 MCG: at 09:51

## 2021-01-01 RX ADMIN — ENOXAPARIN SODIUM 40 MG: 40 INJECTION SUBCUTANEOUS at 09:25

## 2021-01-01 ASSESSMENT — PAIN SCALES - GENERAL
PAINLEVEL_OUTOF10: 0
PAINLEVEL_OUTOF10: 7
PAINLEVEL_OUTOF10: 0

## 2021-08-16 PROBLEM — E44.0 MODERATE PROTEIN-CALORIE MALNUTRITION (HCC): Status: ACTIVE | Noted: 2021-01-01

## 2021-08-16 PROBLEM — Z85.118 HISTORY OF LUNG CANCER: Status: ACTIVE | Noted: 2021-01-01

## 2021-08-16 PROBLEM — L98.9 SCALP LESION: Status: ACTIVE | Noted: 2021-01-01

## 2021-08-16 PROBLEM — A41.9 SEPSIS (HCC): Status: ACTIVE | Noted: 2021-01-01

## 2021-08-16 PROBLEM — J96.01 ACUTE RESPIRATORY FAILURE WITH HYPOXIA (HCC): Status: ACTIVE | Noted: 2021-01-01

## 2021-08-16 PROBLEM — F03.90 DEMENTIA (HCC): Status: ACTIVE | Noted: 2021-01-01

## 2021-08-16 PROBLEM — D64.9 ANEMIA: Status: ACTIVE | Noted: 2021-01-01

## 2021-08-16 PROBLEM — J18.9 PNA (PNEUMONIA): Status: ACTIVE | Noted: 2021-01-01

## 2021-08-16 NOTE — CONSULTS
Palliative Care Department  [phone]  Palliative Care Initial Consult  Provider Anton Nicholson  [id number]  Hospital Day: 2  Date of Initial Consult: 8/16/2021  Referring Provider: Lee Marion MD  Palliative Medicine was consulted for assistance with: [address], code status    Brief Summary of Hospital Course: Irais Mosley is a 80 y. o. with a medical history of right lung cancer, HTN, HLD, dementia who was admitted on 8/15/2021 from home with a CHIEF COMPLAINT of SOB, fever. ASSESSMENT/PLAN:     Pertinent Hospital Problems      Multifocal pneumonia in right lung   Sepsis secondary to pneumonia   Acute hypoxic respiratory failure secondary to pneumonia   History of right lung cancer   Right scalp lesion concerning for skin cancer vs ulcerated seborrheic keratosis   Anemia   Acute metabolic encephalopathy secondary to sepsis   Asbestosis   Moderate to severe protein calorie malnutrition   Dementia      Palliative Care Encounter / Counseling Regarding Goals of Care  Please see detailed goals of care discussion as below   At this time, Irais Mosley, Does Not have capacity for medical decision-making. Capacity is time limited and situation/question specific   During encounter daughters David Rodriguez and Chary Hernandes and son Scooter Swartz were surrogate medical decision-maker   Outcome of goals of care meeting: code status changed to [facility], plan to go home on d/c   Code status DNR-CC   Advanced Directives: no POA or living will in [facility]  [employer] Surrogate/Legal NOK:  o Daughter Chaitanya Gentile [phone]  o Daughter Arabella Newell [phone]  o [facility] Irais Mosley [phone]- medical HCPOA    Spiritual assessment: no spiritual distress identified  Bereavement and grief: to be determined  Referrals to: none today    SUBJECTIVE:     Details of Conversation:  Chart reviewed. Introduced self and PM to patient. Daughters David Rodriguez and Chary Hernandes were at bedside.   Patient lives with David Rodriguez but Megan Shar and son Clif Meier help take care of patient as well, he is never left alone. Per Meera Mckinley patient has limited vocabulary, but still continent and able to ambulate, and eats well. Their plan is for patient to go home on d/c.  Daughters wanted son Clif Meier included in decision regarding code status, called Clif Meier and he verified that family wants patient to be DNR-CC at this time. GOC and code status established, will sign off at this time. Physical Function:  PPS: 50-60    History Of Present Illness:    Per H&P  \"80 y.o. male who presented to [facility] with medical history of right lung cancer many years ago, hyperlipidemia, hypertension, dementia, oriented to self alone at baseline and speech impairment. Patient is here with her daughter, they live together. She brought him in because he has been having cough on and off since yesterday, associated with shortness of breath and rigors. Oral intake has reduced and patient is beginning to get altered. She describes altered mental status mostly as confusion. He has not had any vomiting or diarrhea. Daughter has not noticed any difficulty swallowing or aspirations. He has a nonhealing skin lesion to the right upper scalp that he has been picking at for some time. Patient is nonverbal and not able to answer any questions.     Vital signs notable for temperature of 101.3, respiratory 26, saturation of 84% on room air. Labs showed glucose of 183, white count 12.3, hemoglobin 10.3, tested negative for Covid. Troponin 102. Chest x-ray shows chronic bilateral pleural plaques which are similar to prior studies, dense opacity in the right mid to lower lung zone due to pneumonia. Lucency in the left lung suspicious for pneumothorax. CTA of the chest is negative for PE, shows right lung multifocal pneumonia, asbestosis and cardiomegaly. His EKG done in the ER is currently not documented or available. \"    Past Medical History:          Diagnosis Date    Cancer ([facility] [address] 20 years ago    right lung    History of EKG 01/23/2014    per Dr Roger Mcardle on chart.  Hoarseness     Hyperlipidemia     Hypertension     no recent problems    Macular degeneration        Past Surgical History:          Procedure Laterality Date    APPENDECTOMY      CATARACT REMOVAL WITH IMPLANT Bilateral     LARYNGOSCOPY  01/30/2014    LUNG SURGERY Left 20 years ago    \"portion of lung removed, bottom lobe for mass, no further traetments\"       Medications Prior to Admission:      Prior to Admission medications    Medication Sig Start Date End Date Taking? Authorizing Provider   traZODone (DESYREL) 50 MG tablet Take 0.5 tablets by mouth nightly 11/3/19   Harrie Goodpasture, MD       Allergies:  Patient has no known allergies. Social History:      The patient currently lives at home with daughter    TOBACCO:   reports that he has quit smoking. He has never used smokeless tobacco.  ETOH:   reports current alcohol use. Family History:       Reviewed in detail and positive as follows:    History reviewed. No pertinent family history. REVIEW OF SYSTEMS:   Pertinent positives as noted in the HPI. All other systems reviewed and negative.     OBJECTIVE:   Prognosis: depends upon goals and Guarded    Physical Exam:  BP (!) 159/74   Pulse 93   Temp 97.6 °F (36.4 °C) (Oral)   Resp 22   Ht 5' 6\" (1.676 m)   Wt 101 lb (45.8 kg)   SpO2 94%   BMI 16.30 kg/m²     Constitutional:  Elderly, cachectic, NAD, sleeping, easily arousable  Eyes: no scleral icterus, normal lids, no discharge  ENMT:  Normocephalic, atraumatic, mucosa moist, EOMI  Neck:  trachea midline, no JVD  Lungs:  CTA bilaterally, no audible rhonchi or wheezes noted, respirations unlabored, no retractions  Heart[de-identified]  RRR, distant heart tones, no murmur, rub, or gallop noted during exam  Abd:  Soft, non tender, non distended, bowel sounds present  :  deferred  MSK: sarcopenia present  Ext:  Moving all extremities, no edema, pulses present  Skin:  Warm and dry, dressing on top of head, tattoo on hand  Psych: non-anxious affect  Neuro:  PERRL, grossly nonfocal; following commands, answers simple yes/no questions    Objective data reviewed: labs, images, records, medication use, vitals and chart    Labs:     Recent Labs     08/15/21  2028 08/16/21  0543   WBC 12.3* 7.1   HGB 10.3* 8.7*   HCT 32.0* 27.4*    143     Recent Labs     08/15/21  2028 08/16/21  0543    139   K 4.0 3.0*    113*   CO2 29 20*   BUN 23 17   CREATININE 0.8 0.5*   CALCIUM 8.6 5.2*     No results for input(s): AST, ALT, BILIDIR, BILITOT, ALKPHOS in the last 72 hours. No results for input(s): INR in the last 72 hours. No results for input(s): Jarrod Randal in the last 72 hours. Urinalysis:      Lab Results   Component Value Date    NITRU Negative 11/01/2019    WBCUA 0-1 10/19/2019    BACTERIA RARE 10/19/2019    RBCUA NONE 10/19/2019    BLOODU Negative 11/01/2019    SPECGRAV <=1.005 11/01/2019    GLUCOSEU Negative 11/01/2019         Discussed patient and the plan of care with the other IDT members: Palliative Medicine IDT Team, Floor Nurse, Patient and Family    Time/Communication  Greater than 50% of time spent, total 50 minutes in counseling and coordination of care at the bedside regarding goals of care, diagnosis and prognosis and see above. Thank you for allowing Palliative Medicine to participate in the care of [facility].

## 2021-08-16 NOTE — PROGRESS NOTES
Hospitalist Progress Note      PCP: Juliet Ca MD    Date of Admission: 8/15/2021    Chief Complaint: [facility] Course: ** developed a cough that has gotten worse, found to have pneumonia and asbestosis noted on CXR and CT of chest. Has a known history of lung cancer with removal of RLL years ago. He denies chest pain, but has an elevated TT, will be seen by cardiology . Daughter is very aware of suspicious lesion on right scalp. She states it dries up and then her dad picks at it. She said she will get him to a dermatologist as an OP    Subjective: * SOB      Medications:  Reviewed    Infusion Medications    sodium chloride       Scheduled Medications    memantine  5 mg Oral Daily    therapeutic multivitamin-minerals  1 tablet Oral Daily    vitamin B-12  500 mcg Oral Daily    cefTRIAXone (ROCEPHIN) IV  1,000 mg Intravenous Q24H    doxycycline (VIBRAMYCIN) IV  100 mg Intravenous Q12H    sodium chloride flush  5-40 mL Intravenous 2 times per day    enoxaparin  40 mg Subcutaneous Daily    potassium bicarb-citric acid  40 mEq Oral BID    magnesium sulfate  4,000 mg Intravenous Once    albuterol  2.5 mg Nebulization 4x daily     PRN Meds: sodium chloride flush, sodium chloride, ondansetron **OR** ondansetron, polyethylene glycol, acetaminophen **OR** acetaminophen    No intake or output data in the 24 hours ending 08/16/21 1308    Exam:    BP (!) 159/74   Pulse 93   Temp 97.6 °F (36.4 °C) (Oral)   Resp 22   Ht 5' 6\" (1.676 m)   Wt 101 lb (45.8 kg)   SpO2 94%   BMI 16.30 kg/m²           Gen: *well developed  HEENT: NC/AT, moist mucous membranes, no oropharyngeal erythema or exudate. bandaide on head lesion   Neck: supple, trachea midline, no anterior cervical or SC LAD  Heart:  Normal s1/s2, RRR, no murmurs, gallops, or rubs.    Lungs:  *coarse breath sounds* bilaterally,   Abd: bowel sounds present, soft, nontender, nondistended, no masses  Extrem:  No clubbing, cyanosis,  *no* edema  Skin: no rashes or lesions  Psych: Not oriented to date  Neuro: grossly intact, moves all four extremities. Capillary Refill: Brisk,< 3 seconds   Peripheral Pulses: +2 palpable, equal bilaterally               Labs:   Recent Labs     08/15/21  2028 08/16/21  0543   WBC 12.3* 7.1   HGB 10.3* 8.7*   HCT 32.0* 27.4*    143     Recent Labs     08/15/21  2028 08/16/21  0543    139   K 4.0 3.0*    113*   CO2 29 20*   BUN 23 17   CREATININE 0.8 0.5*   CALCIUM 8.6 5.2*     No results for input(s): AST, ALT, BILIDIR, BILITOT, ALKPHOS in the last 72 hours. No results for input(s): INR in the last 72 hours. No results for input(s): Adan Hug in the last 72 hours. No results for input(s): AST, ALT, ALB, BILIDIR, BILITOT, ALKPHOS in the last 72 hours. Recent Labs     08/15/21  2028   LACTA 1.8     No results found for: Ammy Knight  Lab Results   Component Value Date    AMMONIA <10.0 (L) 11/01/2019    AMMONIA <10.0 (L) 10/19/2019       Assessment:    Active Hospital Problems    Diagnosis Date Noted    PNA (pneumonia) [J18.9] 08/16/2021    Sepsis ([facility] [address] [A41.9] 08/16/2021    Acute respiratory failure with hypoxia (HCC) [J96.01] 08/16/2021    Scalp lesion [L98.9] 08/16/2021    Anemia [D64.9] 08/16/2021    Dementia ([facility] [address] [F03.90] 08/16/2021    History of lung cancer [Z85.118] 08/16/2021    Moderate protein-calorie malnutrition ([facility] [address] [E44.0] 08/16/2021    Altered mental status [R41.82] 10/19/2019   asbestosis noted on CXR  Pulmonary HTN on CTA of chest  Hypokalemia   hypomagnesemia  Elevated TT'  Anemia of chronic disease  Plan:  *card to see  Replace k and mag  Aerosols  Cont doxy   cont rocephin  namenda      DVT Prophylaxis: lovenox  Diet: Adult Oral Nutrition Supplement; Standard High Calorie/High Protein Oral Supplement  ADULT DIET;  Regular  Code Status: Full Code    PT/OT Eval Status:  ordered    Dispo - * home      Electronically signed by Shannon Castellanos DO on 8/16/2021 at [place] [id number]

## 2021-08-16 NOTE — CARE COORDINATION
Spoke with patient's daughter, Trice Palencia at bedside. Patient lives with daughter, when she is at work son or other daughter will stay with patient so that he always has 24 hour supervision. He does not use assistive devices. PCP was Dr. Kt Cr and now that he is no longer seeing office patients daughter asked that he be arranged with new PCP. Will arrange prior to discharge. Currently on 2.5L oxygen, he does not have home oxygen. She plans for patient to return home when released. For questions I can be reached at [phone].  Vineet Crespo, [place]

## 2021-08-16 NOTE — PROGRESS NOTES
ADVANCED CARE PLANNING    Name:Filiberto Almonte       :  2/10/1927              MRN:  [id number]      Purpose of Encounter: Advanced care planning in light of **PENUMONIA AND DEMENTIA  Parties in attendance: :Reji Mike DO, Family members:**DAUGHTER. Decisional Capacity:No    Diagnosis: Active Problems:    Altered mental status    PNA (pneumonia)    Sepsis ([address]    Acute respiratory failure with hypoxia (HCC)    Scalp lesion    Anemia    Dementia (HCC)    History of lung cancer    Moderate protein-calorie malnutrition (HCC)  Resolved Problems:    * No resolved hospital problems. *    Patients Medical Story: Daughter states her brother is the POA for healthcare and her dad is a full code. Daughter lives with her dad, his cough has gotten progressively worse. She says no formal diagnosis of dementia, but he does have memory problems. Goals of Care Determinations: Patient wishes to focus on *pneumonia  Plan: Will notify Paul Royal MD of change in care plan. Will look at further interventions as needed. Code Status: At this time patient wishes to be Full Code  Time Spent with Patient: *30* minutes      Electronically signed by Reji Dougherty DO on 2021 at 1:05 PM  Thank you Paul Royal MD for the opportunity to be involved in this patient's care.

## 2021-08-16 NOTE — CONSULTS
Inpatient Cardiology Consultation      Reason for Consult:  Elevated troponin    Consulting Physician: Dr. Elvira Haddad    Requesting Physician:  Dr. Merced Odonnell     Date of Consultation: 8/16/2021    History of Present Illness:   Mr. Cara Santa is a 80year old gentleman who is previously unknown to our practice; his medical history includes hypertension, hyperlipidemia, and remote lung cancer, treated with surgery. He is unable to provide significant history and information for the consult is taken from review of the EMR as well as discussion with his daughter. He lives at home and is cared for by family. According to his daughter, he had been in his usual state of health until Saturday when he developed cough, dyspnea, and fever of 101. These symptoms continued into Sunday and he became increasingly confused, prompting her to bring him to the ER. He was hypoxic on arrival (84% on room air) and febrile (101.3); CXR was read as showing chronic pleural plaques and right mid to lower lung pneumonia, and CTA of the chest multifocal pneumonia. He was treated with Rocephin, Vibramycin, and Tylenol and admitted to telemetry. Cardiology consult was requested for troponin level of 102. Currently, he is resting in bed and appears frail and chronically ill but in no acute distress. He is unable to answer most questions. Past Medical History:    Past Medical History:   Diagnosis Date    Cancer ([address] 20 years ago    right lung    History of EKG 01/23/2014    per Dr Sammie Talbert on chart.     Hoarseness     Hyperlipidemia     Hypertension     no recent problems    Macular degeneration        Past Surgical History:    Past Surgical History:   Procedure Laterality Date    APPENDECTOMY      CATARACT REMOVAL WITH IMPLANT Bilateral     LARYNGOSCOPY  01/30/2014    LUNG SURGERY Left 20 years ago    \"portion of lung removed, bottom lobe for mass, no further traetments\"       Medications Prior to Admit:  Prior to Admission medications    Medication Sig Start Date End Date Taking? Authorizing Provider   traZODone (DESYREL) 50 MG tablet Take 0.5 tablets by mouth nightly 11/3/19   Savi Cornell MD       Current Medications:    Current Facility-Administered Medications: memantine (NAMENDA) tablet 5 mg, 5 mg, Oral, Daily  therapeutic multivitamin-minerals 1 tablet, 1 tablet, Oral, Daily  vitamin B-12 (CYANOCOBALAMIN) tablet 500 mcg, 500 mcg, Oral, Daily  cefTRIAXone (ROCEPHIN) 1,000 mg in sterile water 10 mL IV syringe, 1,000 mg, Intravenous, Q24H  doxycycline (VIBRAMYCIN) 100 mg in dextrose 5 % 100 mL IVPB, 100 mg, Intravenous, Q12H  sodium chloride flush 0.9 % injection 5-40 mL, 5-40 mL, Intravenous, 2 times per day  sodium chloride flush 0.9 % injection 5-40 mL, 5-40 mL, Intravenous, PRN  0.9 % sodium chloride infusion, 25 mL, Intravenous, PRN  enoxaparin (LOVENOX) injection 40 mg, 40 mg, Subcutaneous, Daily  ondansetron (ZOFRAN-ODT) disintegrating tablet 4 mg, 4 mg, Oral, Q8H PRN **OR** ondansetron (ZOFRAN) injection 4 mg, 4 mg, Intravenous, Q6H PRN  polyethylene glycol (GLYCOLAX) packet 17 g, 17 g, Oral, Daily PRN  acetaminophen (TYLENOL) tablet 650 mg, 650 mg, Oral, Q6H PRN **OR** acetaminophen (TYLENOL) suppository 650 mg, 650 mg, Rectal, Q6H PRN  potassium bicarb-citric acid (EFFER-K) effervescent tablet 40 mEq, 40 mEq, Oral, BID  magnesium sulfate 4000 mg in 100 mL IVPB premix, 4,000 mg, Intravenous, Once  albuterol (PROVENTIL) nebulizer solution 2.5 mg, 2.5 mg, Nebulization, 4x daily    Allergies:  Patient has no known allergies. Social History:    Social History     Socioeconomic History    Marital status:       Spouse name: Not on file    Number of children: Not on file    Years of education: Not on file    Highest education level: Not on file   Occupational History    Not on file   Tobacco Use    Smoking status: Former Smoker    Smokeless tobacco: Never Used    Tobacco comment: quit prior to lung surgery more than 20 years ago   Vaping Use    Vaping Use: Never used   Substance and Sexual Activity    Alcohol use: Yes     Comment: rarely a beer    Drug use: No    Sexual activity: Not on file   Other Topics Concern    Not on file   Social History Narrative    Not on file     Social Determinants of Health     Financial Resource Strain:     Difficulty of Paying Living Expenses:    Food Insecurity:     Worried About Running Out of Food in the Last Year:     [address] in the Last Year:    Transportation Needs:     Lack of Transportation (Medical):  Lack of Transportation (Non-Medical):    Physical Activity:     Days of Exercise per Week:     Minutes of Exercise per Session:    Stress:     Feeling of Stress :    Social Connections:     Frequency of Communication with Friends and Family:     Frequency of Social Gatherings with Friends and Family:     Attends [employer] Services:     Active Member of Clubs or Organizations:     Attends Club or Organization Meetings:     Marital Status:    Intimate Partner Violence:     Fear of Current or Ex-Partner:     Emotionally Abused:     Physically Abused:     Sexually Abused:        Family History:   History reviewed. No pertinent family history. Noncontributory due to his age     Review of Systems: Unable to obtain review of systems from him; history as obtained from his daughter in HPI    Physical Exam:   BP (!) 159/74   Pulse 93   Temp 97.6 °F (36.4 °C) (Oral)   Resp 22   Ht 5' 6\" (1.676 m)   Wt 101 lb (45.8 kg)   SpO2 94%   BMI 16.30 kg/m²   CONST:  Frail elderly gentleman who appears of stated age. Awake, alert and cooperative. Sick looking but in no acute distress. HEENT:   Head- Dressing to right side of scalp   Throat- Oral mucosa pink and moist  Neck-  No stridor, jugular venous distention or carotid bruit. CHEST: Chest symmetrical and non-tender to palpation. Respirations unlabored.    RESPIRATORY:  Coarse breath sounds anterior lung fields, with scattered rales posteriorly. CARDIOVASCULAR:     Heart Ausculation- Regular rate and rhythm, no murmur,  s3, s4 or rub   PV: No lower extremity edema. No varicosities. Pedal pulses palpable  ABDOMEN: Soft, non-tender to light palpation. Bowel sounds present. No palpable masses   MS: Skeletal muscle atrophy   : Deferred  SKIN: Warm, pale, and dry  NEURO / PSYCH: Oriented to person only. Unable to answer most questions. Follows all commands. Pleasant affect     Telemetry: SR/ST, 90s to low 100s  ECG: not available from ER (has been requested)     No intake or output data in the 24 hours ending 08/16/21 1320    Labs:   CBC:   Recent Labs     08/15/21  2028 08/16/21  0543   WBC 12.3* 7.1   HGB 10.3* 8.7*   HCT 32.0* 27.4*    143     BMP:   Recent Labs     08/15/21  2028 08/16/21  0543    139   K 4.0 3.0*   CO2 29 20*   BUN 23 17   CREATININE 0.8 0.5*   LABGLOM >60 >60   CALCIUM 8.6 5.2*     Mag:   Recent Labs     08/16/21  0543   MG 1.0*     FASTING LIPID PANEL:  Lab Results   Component Value Date    CHOL 154 01/27/2014    HDL 55 01/27/2014    LDLCALC 81 01/27/2014    TRIG 92 01/27/2014     LIVER PROFILE:No results for input(s): AST, ALT, LABALBU in the last 72 hours. CTA chest 8/15/2021:  Impression:     No identified pulmonary embolism. Prominent airspace consolidation in the right lung most notable towards the base consistent with multifocal pneumonia.  Follow-up to resolution recommended. 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 Mild cardiomegaly. CXR 8/15/2021:  Impression:      Chronic bilateral pleural plaques which are similar in appearance to the prior study.  Dense consolidative airspace opacities in the right mid to lower lung zone are felt to likely be reflective of pneumonia. Lucency between a large, calcified plaque at the peripheral left mid lung zone in the left lateral ribs was present on the prior examination. Unfortunately, given the appearance, I cannot completely exclude a left   basilar pneumothorax although this is not likely.  Correlation with CT of the chest could be considered, if clinically appropriate. Assessment:  1. Elevated troponin:  · In setting of pneumonia, fever, and hypoxia  · No complaints of chest pain   · ECG not available for review at this time    2. Acute hypoxic respiratory failure     3. Sepsis/multifocal pneumonia    4. Hypomagnesemia and hypokalemia    5. Probable dementia    6. Anemia     7. Frailty: BMI 16.3    Recommendations  1. Supplement electrolytes    2. Continue treatment of pneumonia    3. The troponin elevation in the setting of acute infectious illness and hypoxia was discussed with his daughter. 4. No plans for any diagnostic or therapeutic cardiac procedures due to his advanced age, frailty, and concurrent illness. The above assessment and treatment plan was made in collaboration with Dr. Severiano Hoe. Electronically signed by FAWN Priest on 8/16/2021 at 1:20 PM     Black Delong MD    I have personally participated in a face-to-face and personally obtained history and performed physical exam on the date of service. I reviewed chart, vitals, labs and radiologic studies. I also participated in medical decision making with FAWN Priest on the date of service All of the assessments and recommendations are from me and I agree with all of the pertinent clinical information, assessment and treatment plan. I have reviewed and edited the note above based on my findings during my history, exam, and decision making. Please see my additional contributions to the history, physical exam, assessment, and recommendations below. HISTORY OF PRESENT ILLNESS:     Reviewed, as above      Past medical history:  Reviewed, as above. Past surgical history:  Reviewed, as above.     Current medications:  Reviewed, as above    Allergies:  Reviewed, as above    Social history:  Reviewed, as above    Family medical history:  Reviewed, as above. REVIEW OF SYSTEMS:   Reviewed, as above. PHYSICAL EXAM:   CONSTITUTIONAL:  awake, alert, cooperative, no apparent distress, and appears stated age  EYES:  lids and lashes normal and pupils equal, round and reactive to light, anicteric sclerae  HEAD:  normocepalic, without obvious abnormality, atraumatic, pink, moist mucous membranes. NECK:  Supple, symmetrical, trachea midline, no adenopathy, thyroid symmetric, not enlarged and no tenderness, skin normal  HEMATOLOGIC/LYMPHATICS:  no cervical lymphadenopathy and no supraclavicular lymphadenopathy  LUNGS:  No increased work of breathing, good air exchange, bilateral rhonchi  CARDIOVASCULAR:  Normal apical impulse, regular rate and rhythm, normal S1 and S2, no S3 or S4, 3/6 systolic murmur at the apex, 3/6 systolic murmur at the left lower sternal border, no JVD, no carotid bruit, no pedal edema, good carotid upstroke bilaterally. ABDOMEN:  Soft, nontender, no masses, no hepatomegaly or splenomegaly, BS+  CHEST: nontender to palpation, expands symmetrically  MUSCULOSKELETAL:  No clubbing no cyanosis. there is no redness, warmth, or swelling of the joints  NEUROLOGIC:  Alert, awake. SKIN:  no bruising or bleeding, normal skin color, texture, turgor and no redness, warmth, or swelling    /86   Pulse 94   Temp 99.1 °F (37.3 °C) (Oral)   Resp 24   Ht 5' 6\" (1.676 m)   Wt 101 lb (45.8 kg)   SpO2 (!) 86%   BMI 16.30 kg/m²       I/O last 3 completed shifts: In: 477.5 [P.O.:100; I.V.:377.5]  Out: -   No intake/output data recorded.       DATA:   I personally reviewed the admission EKG with the following interpretation: Not available for review    ECHO: Not performed to date  Stress Test: Not performed to date  Angiography: Not performed to date  Cardiology Labs:   BMP:    Lab Results   Component Value Date     08/16/2021    K 3.0 08/16/2021     08/16/2021    CO2 20 08/16/2021    BUN 17 08/16/2021     CMP:    Lab Results   Component Value Date     08/16/2021    K 3.0 08/16/2021     08/16/2021    CO2 20 08/16/2021    BUN 17 08/16/2021    PROT 8.4 11/01/2019     CBC:    Lab Results   Component Value Date    WBC 7.1 08/16/2021    RBC 2.89 08/16/2021    HGB 8.7 08/16/2021    HCT 27.4 08/16/2021    MCV 94.8 08/16/2021    RDW 12.8 08/16/2021     08/16/2021     PT/INR:  No results found for: PTINR  PT/INR Warfarin:  No components found for: PTPATWAR, PTINRWAR  PTT:    Lab Results   Component Value Date    APTT 29.5 10/19/2019     PTT Heparin:  No components found for: APTTHEP  Magnesium:    Lab Results   Component Value Date    MG 1.0 08/16/2021     TSH:  No results found for: TSH  TROPONIN:  No components found for: TROP  BNP:  No results found for: BNP  FASTING LIPID PANEL:    Lab Results   Component Value Date    CHOL 154 01/27/2014    HDL 55 01/27/2014    TRIG 92 01/27/2014     CT HEAD WO CONTRAST   Final Result   No acute intracranial findings. Consider MRI if symptoms persist.      Volume loss and findings suggestive of chronic microvascular ischemia. CTA CHEST W CONTRAST   Final Result   No identified pulmonary embolism. Prominent airspace consolidation in the right lung most notable towards the   base consistent with multifocal pneumonia. Follow-up to resolution   recommended. 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 Mild cardiomegaly. Other chronic appearing findings. XR CHEST PORTABLE   Final Result   Chronic bilateral pleural plaques which are similar in appearance to the   prior study. Dense consolidative airspace opacities in the right mid to   lower lung zone are felt to likely be reflective of pneumonia.       Lucency between a large, calcified plaque at the peripheral left mid lung   zone in the left lateral ribs was present on the prior examination. Unfortunately, given the appearance, I cannot completely exclude a left   basilar pneumothorax although this is not likely. Correlation with CT of the   chest could be considered, if clinically appropriate. I have personally reviewed the laboratory, cardiac diagnostic and radiographic testing as outlined above:    IMPRESSION:  1. Elevated troponin: Flat pattern, suspect secondary to comorbid conditions specially poor respiratory status and possible infection  2. Acute hypoxic respiratory failure  3. Hypokalemia: Will replace  4. Multifocal pneumonia      RECOMMENDATIONS:   1. Continue current treatment  2. Continue respiratory support as needed  3. No further cardiac work-up is planned at this point of time. I have reviewed my findings and recommendations with patient, no family at bedside    Thank you for the consult  Electronically signed by Tramaine Castillo MD on 8/17/2021 at 2:46 PM  NOTE: This report was transcribed using voice recognition software.  Every effort was made to ensure accuracy; however, inadvertent computerized transcription errors may be present

## 2021-08-16 NOTE — ED NOTES
Family request pt stay in his sweatshirt and pants \"its just so cold in here\".       Caron Simons RN  08/16/21 6315

## 2021-08-16 NOTE — H&P
Hospital Medicine History & Physical      PCP: Abdirizak Alston MD    Date of Admission: 8/15/2021    Date of Service: Pt seen/examined on 8/15/2021 and Admitted to Inpatient with expected LOS greater than two midnights due to medical therapy. Chief Complaint: Shortness of breath and fever      History Of Present Illness:      80 y.o. male who presented to [facility] with medical history of right lung cancer many years ago, hyperlipidemia, hypertension, dementia, oriented to self alone at baseline and speech impairment. Patient is here with her daughter, they live together. She brought him in because he has been having cough on and off since yesterday, associated with shortness of breath and rigors. Oral intake has reduced and patient is beginning to get altered. She describes altered mental status mostly as confusion. He has not had any vomiting or diarrhea. Daughter has not noticed any difficulty swallowing or aspirations. He has a nonhealing skin lesion to the right upper scalp that he has been picking at for some time. Patient is nonverbal and not able to answer any questions. Vital signs notable for temperature of 101.3, respiratory 26, saturation of 84% on room air. Labs showed glucose of 183, white count 12.3, hemoglobin 10.3, tested negative for Covid. Troponin 102. Chest x-ray shows chronic bilateral pleural plaques which are similar to prior studies, dense opacity in the right mid to lower lung zone due to pneumonia. Lucency in the left lung suspicious for pneumothorax. CTA of the chest is negative for PE, shows right lung multifocal pneumonia, asbestosis and cardiomegaly. His EKG done in the ER is currently not documented or available. He is being admitted for further management. Past Medical History:          Diagnosis Date    Cancer ([address] 20 years ago    right lung    History of EKG 01/23/2014    per Dr Eduardo Price on chart.     Hoarseness     Hyperlipidemia     Hypertension no recent problems    Macular degeneration        Past Surgical History:          Procedure Laterality Date    APPENDECTOMY      CATARACT REMOVAL WITH IMPLANT Bilateral     LARYNGOSCOPY  01/30/2014    LUNG SURGERY Left 20 years ago    \"portion of lung removed, bottom lobe for mass, no further traetments\"       Medications Prior to Admission:      Prior to Admission medications    Medication Sig Start Date End Date Taking? Authorizing Provider   traZODone (DESYREL) 50 MG tablet Take 0.5 tablets by mouth nightly 11/3/19   Kuldip Hussein MD   vitamin B-12 (CYANOCOBALAMIN) 500 MCG tablet Take 1 tablet by mouth daily 11/3/19   Kuldip Hussein MD   Lutein 6 MG CAPS Take 1 capsule by mouth daily 11/3/19   Kuldip Hussein MD   [facility]) 5 MG tablet Take 1 tablet by mouth daily 11/4/19   Kuldip Hussein MD   Multiple Vitamins-Minerals (THERAPEUTIC MULTIVITAMIN-MINERALS) tablet Take 1 tablet by mouth daily 11/4/19   Kuldip Hussein MD       Allergies:  Patient has no known allergies. Social History:      The patient currently lives at home, here with his daughter. TOBACCO:   reports that he has quit smoking. He has never used smokeless tobacco.  ETOH:   reports current alcohol use. Family History:     Reviewed in detail Positive as follows: Coronary artery disease    History reviewed. No pertinent family history. REVIEW OF SYSTEMS:   Pertinent positives as noted in the HPI. Unable to obtain due to altered mental status. PHYSICAL EXAM:    /74   Pulse 81   Temp 98.7 °F (37.1 °C) (Axillary)   Resp 16   Ht 5' 6\" (1.676 m)   Wt 101 lb (45.8 kg)   SpO2 100%   BMI 16.30 kg/m²     General appearance: Elderly male, chronically ill looking and weak, frail, cachectic, no apparent distress, appears stated age and  Afebrile. Does not follow command. HEENT:  Normal cephalic, atraumatic without obvious deformity. Pupils equal, round, and reactive to light.   Conjunctivae/corneas examination. Unfortunately, given the appearance, I cannot completely exclude a left   basilar pneumothorax although this is not likely. Correlation with CT of the   chest could be considered, if clinically appropriate. CTA CHEST W CONTRAST    (Results Pending)       ASSESSMENT:    Active Hospital Problems    Diagnosis Date Noted    PNA (pneumonia) [J18.9] 08/16/2021    Sepsis ([address] [A41.9] 08/16/2021    Acute respiratory failure with hypoxia ([address] [J96.01] 08/16/2021    Scalp lesion [L98.9] 08/16/2021    Anemia [D64.9] 08/16/2021    Dementia ([address] [F03.90] 08/16/2021    History of lung cancer [Z85.118] 08/16/2021    Moderate protein-calorie malnutrition ([address] [E44.0] 08/16/2021    Altered mental status [R41.82] 10/19/2019         PLAN:  1.  Multifocal pneumonia involving the right lung, IV Rocephin and doxycycline, tested negative for Covid, check urine Legionella and streptococcal antigens and swallow study. 2.  Sepsis secondary to above. IV fluids and antibiotics. Blood cultures. 3.  Acute respiratory failure with hypoxia, saturation of 84% on room air. Oxygen to keep sat above 92%. 4.  Right scalp skin lesion suspicious for skin cancer/ulcerated seborrheic keratosis. Daughter has been advised to take him to see a dermatologist once he is discharged from the hospital.  5.  Anemia, monitor hemoglobin. 6.  Altered mental status likely secondary to sepsis and underlying dementia. Check head CT, urinalysis and consider MRI to rule out stroke if mental status does not improve. Neurochecks. 7.  History of right lung cancer many years ago  8. Asbestosis  9. Moderate to severe protein energy malnutrition, dietary supplementation. 10.  Dementia, continue home medications. 11.  Generalized weakness secondary to above. 12.  Palliative care consult to discuss CODE STATUS given advanced age, significant dementia and all of the above.   13.  Patient currently does not have a PCP, may be referred to family medicine or internal medicine residency clinic upon discharge. DVT Prophylaxis: Lovenox if head CT is negative for bleed. Diet: No diet orders on file n.p.o. until mental status improves enough to safely feed. Code Status: Prior full code    PT/OT Eval Status: Evaluation and treatment    Dispo -inpatient/telemetry       Maria L Armendariz MD    Thank you Abdirizak Alston MD for the opportunity to be involved in this patient's care.

## 2021-08-16 NOTE — PROGRESS NOTES
Call out to er regarding EKG, spoke with nurse, and will fax results to floor. Electronically signed by Angela Chacon RN on 8/16/2021 at 11:07 AM

## 2021-08-16 NOTE — PROGRESS NOTES
SPEECH/LANGUAGE PATHOLOGY  CLINICAL ASSESSMENT OF SWALLOWING FUNCTION   and PLAN OF CARE    PATIENT NAME:  Daniel Traylor  (male)     MRN:  [id number]    :  2/10/1927  (80 y.o.)  STATUS:  Inpatient: Room 4502/4502-A    TO21   Speech Language Pathology clinical swallow screening Start: 21, End: 21, ONE TIME, Standing Count: 1 Occurrences, Naresh Houston MD  TODAY'S DATE:  2021    REASON FOR REFERRAL: Multifocal pneumonia involving the right lung  EVALUATING THERAPIST: Marcel Moy, SLP                 RESULTS:    DYSPHAGIA DIAGNOSIS:   Clinical indicators of swallow within functional limits for age/premorbid functioning      DIET RECOMMENDATIONS:  Soft and bite size consistency solids (dysphagia 3) with  thin liquids, MEDICATION ADMINISTRATION, Administer medication crushed, as able, with pudding/applesauce and Administer medication whole, ONE AT A TIME, in pudding/applesauce     FEEDING RECOMMENDATIONS:     Assistance level:  Stand by assistance is needed during all oral intake, Set-up is required for all oral intake      Compensatory strategies recommended: Small bites/sips, Alternate solids and liquids and Check for oral pocketing      Discussed recommendations with nursing and/or faxed report to referring provider: Yes    SPEECH THERAPY  PLAN OF CARE   The dysphagia POC is established based on physician order, dysphagia diagnosis and results of clinical assessment     Dysphagia therapy is not recommended     Conditions Requiring Skilled Therapeutic Intervention for dysphagia:    not applicable    Specific dysphagia interventions to include:     Not applicable    Specific instructions for next treatment:  not applicable   Patient Treatment Goals:    Short Term Goals:  Not applicable no therapy warranted     Long Term Goals:   Not applicable no therapy warranted      Patient/family Goal:    not applicable    Plan of care discussed with Patient and Family   The Patient did not demonstrate complete understanding of the diagnosis, prognosis and plan of care and Family understand(s) the diagnosis, prognosis and plan of care     Rehabilitation Potential/Prognosis: fair                    ADMITTING DIAGNOSIS: PNA (pneumonia) [J18.9]  Pneumonia of right middle lobe due to infectious organism [J18.9]    VISIT DIAGNOSIS:   Visit Diagnoses       Codes    Pneumonia of right middle lobe due to infectious organism    -  Primary J18.9           PATIENT REPORT/COMPLAINT: denies    meal tray present during evaluation     PRIOR LEVEL OF SWALLOW FUNCTION:    PAST HISTORY OF DYSPHAGIA?: none reported    Home diet: Regular consistency solids with  thin liquids    PROCEDURE:  Consistencies Administered During the Evaluation   Liquids: thin liquid   Solids:  soft solid foods and solid foods      Method of Intake:   cup, straw, spoon  Self fed      Position:   Seated, upright    CLINICAL ASSESSMENT:  Oral Stage:       Decreased mastication due to:  cognitive function and disorganized chewing pattern and Delayed A-P transit due to: cognitive function       Pharyngeal Stage:    No signs of aspiration were noted during this evaluation however, silent aspiration cannot be ruled out at bedside. If silent aspiration is suspected, a Videofluoroscopic Study of Swallowing (MBS) is recommended and requires a physician order. Cognition:   Follows 1 - step directions appropriate for this assessment    Oral Peripheral Examination   Generalized oral weakness    Current Respiratory Status    room air     Parameters of Speech Production  Respiration:  Adequate for speech production  Quality:   Strained  Intensity: Within functional limits    Volitional Swallow: present     Volitional Cough:   present     Pain: No pain reported. EDUCATION:   The Speech Language Pathologist (SLP) completed education regarding results of evaluation and that intervention is warranted at this time.   Learner: Patient and Family  Education: Reviewed results and recommendations of this evaluation, Reviewed diet and strategies, Reviewed signs, symptoms and risks of aspiration, Reviewed recommendations for follow-up and Education Related to Potential Risks and Complications Due to Impairment/Illness/Injury  Evaluation of Education:  Verbalizes understanding    This plan may be re-evaluated and revised as warranted. Evaluation Time includes thorough review of current medical information, gathering information on past medical history/social history and prior level of function, completion of standardized testing/informal observation of tasks, assessment of data and education on plan of care and goals. [x]The admitting diagnosis and active problem list, have been reviewed prior to initiation of this evaluation. ACTIVE PROBLEM LIST:   Patient Active Problem List   Diagnosis    Altered mental status    Metabolic encephalopathy    PNA (pneumonia)    Sepsis ([facility]    Acute respiratory failure with hypoxia (HCC)    Scalp lesion    Anemia    Dementia ([facility]    History of lung cancer    Moderate protein-calorie malnutrition ([facility] [facility]         CPT code:  66653  bedside swallow eval/85188 ydsphagia therapy    Speech Pathologist (SLP) completed education with the patient/family regarding type of swallowing impairment. Reviewed current solid/liquid consistency diet recommendations and discussed compensatory strategies to ensure safe PO intake. Reviewed aspiration precautions. Benefits of modified diet and medication crushing (as able, defer to physician and pharm) reviewed with Pt's daughter. Discussed possibility of MBSS if physician order is received to r/o silent aspiration. Encouraged patient and/or family to engage SLP in unstructured Q&A session relative to identified deficit areas; indicated understanding of all information provided via satisfactory verbal response.       Nelson Cesar M.S., [address] Pathologist  [id number]  8/16/2021

## 2021-08-16 NOTE — ED PROVIDER NOTES
RBC 3.38 (L) 3.80 - 5.80 E12/L    Hemoglobin 10.3 (L) 12.5 - 16.5 g/dL    Hematocrit 32.0 (L) 37.0 - 54.0 %    MCV 94.7 80.0 - 99.9 fL    MCH 30.5 26.0 - 35.0 pg    MCHC 32.2 32.0 - 34.5 %    RDW 12.7 11.5 - 15.0 fL    Platelets 795 983 - 874 E9/L    MPV 10.1 7.0 - 12.0 fL    Neutrophils % 89.6 (H) 43.0 - 80.0 %    Lymphocytes % 1.7 (L) 20.0 - 42.0 %    Monocytes % 8.7 2.0 - 12.0 %    Eosinophils % 1.1 0.0 - 6.0 %    Basophils % 0.2 0.0 - 2.0 %    Neutrophils Absolute 11.07 (H) 1.80 - 7.30 E9/L    Lymphocytes Absolute 0.25 (L) 1.50 - 4.00 E9/L    Monocytes Absolute 1.11 (H) 0.10 - 0.95 E9/L    Eosinophils Absolute 0.00 (L) 0.05 - 0.50 E9/L    Basophils Absolute 0.00 0.00 - 0.20 E9/L    Polychromasia 1+     Hypochromia 1+     Poikilocytes 1+     Ovalocytes 1+     Basophilic Stippling 1+    Basic Metabolic Panel   Result Value Ref Range    Sodium 138 132 - 146 mmol/L    Potassium 4.0 3.5 - 5.0 mmol/L    Chloride 101 98 - 107 mmol/L    CO2 29 22 - 29 mmol/L    Anion Gap 8 7 - 16 mmol/L    Glucose 163 (H) 74 - 99 mg/dL    BUN 23 6 - 23 mg/dL    CREATININE 0.8 0.7 - 1.2 mg/dL    GFR Non-African American >60 >=60 mL/min/1.73    GFR African American >60     Calcium 8.6 8.6 - 10.2 mg/dL   Troponin   Result Value Ref Range    Troponin, High Sensitivity 102 (H) 0 - 11 ng/L   Lactic Acid, Plasma   Result Value Ref Range    Lactic Acid 1.8 0.5 - 2.2 mmol/L       RADIOLOGY:  Interpreted by Radiologist.  XR CHEST PORTABLE   Final Result   Chronic bilateral pleural plaques which are similar in appearance to the   prior study. Dense consolidative airspace opacities in the right mid to   lower lung zone are felt to likely be reflective of pneumonia. Lucency between a large, calcified plaque at the peripheral left mid lung   zone in the left lateral ribs was present on the prior examination. Unfortunately, given the appearance, I cannot completely exclude a left   basilar pneumothorax although this is not likely.   Correlation with CT of the   chest could be considered, if clinically appropriate. CTA CHEST W CONTRAST    (Results Pending)       ------------------------- NURSING NOTES AND VITALS REVIEWED ---------------------------   The nursing notes within the ED encounter and vital signs as below have been reviewed. BP (!) 149/74   Pulse 104   Temp 100.5 °F (38.1 °C) (Oral)   Resp 15   Ht 5' 6\" (1.676 m)   Wt 101 lb (45.8 kg)   SpO2 98%   BMI 16.30 kg/m²   Oxygen Saturation Interpretation: Normal      ---------------------------------------------------PHYSICAL EXAM--------------------------------------      Constitutional/General: Alert and oriented x 1 to self only, patient is cachectic thin in appearance  Head: NC/AT  Eyes: PERRL, EOMI  Mouth: Oropharynx clear, handling secretions, no trismus  Neck: Supple, full ROM, no meningeal signs  Pulmonary: Lung sounds demonstrate rhonchi bilaterally there are diminished on the right side however. Patient is tachypneic breathing 26- 30 times a minute. Cardiovascular:  Regular rate and rhythm, no murmurs, gallops, or rubs. 2+ distal pulses  Abdomen: Soft, non tender, non distended,   Extremities: Moves all extremities x 4. Warm and well perfused  Skin: warm and dry without rash  Neurologic: GCS 13. Pleasantly confused he is awake   Psych: Normal Affect      ------------------------------ ED COURSE/MEDICAL DECISION MAKING----------------------  Medications   acetaminophen (TYLENOL) tablet 650 mg (650 mg Oral Given 8/15/21 2113)   0.9 % sodium chloride bolus (0 mLs Intravenous Stopped 8/15/21 2203)   cefTRIAXone (ROCEPHIN) 1,000 mg in sterile water 10 mL IV syringe (0 mg Intravenous Stopped 8/15/21 2314)   doxycycline hyclate (VIBRAMYCIN) capsule 100 mg (100 mg Oral Given 8/15/21 2210)         Medical Decision Making:    Patient have a septic work-up. Suspect pneumonia versus a Covid. Patient was started on IV Rocephin and p.o. doxycycline for pneumonia.   I did speak with patient's daughter said for now he is full code. Consultation was made with hospitalist Dr. Zenia Howard who agreed to admit the patient. CTA of the chest is pending. Suspect this is all related to his pneumonia. He is Covid negative rest of his diagnostics were reviewed. Counseling: The emergency provider has spoken with the patient and discussed todays results, in addition to providing specific details for the plan of care and counseling regarding the diagnosis and prognosis. Questions are answered at this time and they are agreeable with the plan.      --------------------------------- IMPRESSION AND DISPOSITION ---------------------------------    IMPRESSION  1.  Pneumonia of right middle lobe due to infectious organism        DISPOSITION  Disposition: Admit to telemetry  Patient condition is serious                Lola Clifford DO  08/16/21 0000

## 2021-08-17 NOTE — CARE COORDINATION
IV Vibramycin and rocephin continues. Will arrange new PCP prior to discharge per daughter request. He continues on 2.5 L oxygen, will need pulse ox testing if necessary prior to release. Plan is back home with 24 hour supervision. For questions I can be reached at [phone].  Miriam Moreira, [facility]

## 2021-08-17 NOTE — PROGRESS NOTES
Hospitalist Progress Note      Synopsis: Patient admitted on 8/15/2021 for shortness of breath. Found to have pneumonia and started on IV antibiotics. 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 Pt was also noted to have elevated troponin which cardiology does not plan any further workup. Pt to follow up outpatient for suspicious lesion on R scalp with dermatology     Subjective    Continues on oxygen  Shortness of breath improving     Exam:  BP (!) 141/67   Pulse 90   Temp 98.1 °F (36.7 °C) (Oral)   Resp 24   Ht 5' 6\" (1.676 m)   Wt 101 lb (45.8 kg)   SpO2 96%   BMI 16.30 kg/m²   General appearance: Frail elderly gentleman  HEENT: Pupils equal, round, and reactive to light. Conjunctivae/corneas clear. Neck: Supple. No jugular venous distention. Trachea midline. Respiratory:  Normal respiratory effort. Clear to auscultation, bilaterally without Rales/Wheezes/Rhonchi. Cardiovascular: Regular rate and rhythm with normal S1/S2 without murmurs, rubs or gallops. Abdomen: Soft, non-tender, non-distended with normal bowel sounds. Musculoskeletal: No clubbing, cyanosis or edema bilaterally. Brisk capillary refill. 2+ lower extremity pulses (dorsalis pedis).    Skin:  No rashes    Neurologic: Oriented to person     Medications:  Reviewed    Infusion Medications    sodium chloride       Scheduled Medications    memantine  5 mg Oral Daily    therapeutic multivitamin-minerals  1 tablet Oral Daily    vitamin B-12  500 mcg Oral Daily    cefTRIAXone (ROCEPHIN) IV  1,000 mg Intravenous Q24H    doxycycline (VIBRAMYCIN) IV  100 mg Intravenous Q12H    sodium chloride flush  5-40 mL Intravenous 2 times per day    enoxaparin  40 mg Subcutaneous Daily    albuterol  2.5 mg Nebulization 4x daily     PRN Meds: sodium chloride flush, sodium chloride, ondansetron **OR** ondansetron, polyethylene glycol, acetaminophen **OR** acetaminophen    I/O    Intake/Output Summary (Last 24 hours) at 8/17/2021 9002  Last data filed at 8/16/2021 2121  Gross per 24 hour   Intake 477.5 ml   Output --   Net 477.5 ml       Labs:   Recent Labs     08/15/21  2028 08/16/21  0543   WBC 12.3* 7.1   HGB 10.3* 8.7*   HCT 32.0* 27.4*    143       Recent Labs     08/15/21  2028 08/16/21  0543    139   K 4.0 3.0*    113*   CO2 29 20*   BUN 23 17   CREATININE 0.8 0.5*   CALCIUM 8.6 5.2*       No results for input(s): PROT, ALB, ALKPHOS, ALT, AST, BILITOT, AMYLASE, LIPASE in the last 72 hours. No results for input(s): INR in the last 72 hours. No results for input(s): [place] in the last 72 hours. Chronic labs:  Lab Results   Component Value Date    CHOL 154 01/27/2014    TRIG 92 01/27/2014    HDL 55 01/27/2014    LDLCALC 81 01/27/2014    INR 1.1 10/19/2019       Radiology:  Imaging studies reviewed today. ASSESSMENT:  Sepsis 2/2 pneumonia  Elevated troponin  Acute hypoxic respiratory failure  Hypomagnesemia and hypokalemia  Suspected dementia  Severe protein calorie malnutrition     PLAN:  Wean oxygen as tolerated - pt didn't tolerate weaning today and became tachypneic  Continue antibiotics, follow cultures  Oral supplementation  Troponin likely due to pneumonia and hypoxia - cardiology does not recommend any acute intervention  PT/OT  Needs new PCP at [place]   Replace electrolytes as indicated       Diet: Adult Oral Nutrition Supplement; Standard High Calorie/High Protein Oral Supplement  ADULT DIET; Regular  Code Status: DNR-CC  PT/OT Eval Status:   ordered  DVT Prophylaxis:   lovenox  Recommended disposition at discharge:  home w hhc at [place]     +++++++++++++++++++++++++++++++++++++++++++++++++  Joseph Hernandez MD   [facility].  +++++++++++++++++++++++++++++++++++++++++++++++++  NOTE: This report was transcribed using voice recognition software. Every effort was made to ensure accuracy; however, inadvertent computerized transcription errors may be present.

## 2021-08-17 NOTE — PLAN OF CARE
Problem: Falls - Risk of:  Goal: Will remain free from falls  Description: Will remain free from falls  8/16/2021 2159 by Pawan Ordonez RN  Outcome: Met This Shift  8/16/2021 1625 by Meche Reyez RN  Outcome: Met This Shift     Problem: Falls - Risk of:  Goal: Absence of physical injury  Description: Absence of physical injury  8/16/2021 2159 by Pawan Ordonez RN  Outcome: Met This Shift  8/16/2021 1625 by Meche Reyez RN  Outcome: Met This Shift

## 2021-08-18 PROBLEM — E43 SEVERE PROTEIN-CALORIE MALNUTRITION (HCC): Status: ACTIVE | Noted: 2021-01-01

## 2021-08-18 NOTE — PROGRESS NOTES
Comprehensive Nutrition Assessment    Type and Reason for Visit:  Initial, Positive Nutrition Screen    Nutrition Recommendations/Plan: Continue current diet/ONS, Encourage PO intake as able    Nutrition Assessment:  Pt adm w/ sepsis 2/2 PNA. Noted suspected dementia. Remote hx lung CA. Will continue current ONS and monitor    Malnutrition Assessment:  Malnutrition Status:  Severe malnutrition    Context:  Chronic Illness     Findings of the 6 clinical characteristics of malnutrition:  Energy Intake:  7 - 75% or less estimated energy requirements for 1 month or longer  Weight Loss:  Unable to assess (d/t lack of hx on file)     Body Fat Loss:  7 - Severe body fat loss Orbital, Triceps, Fat Overlying Ribs, Buccal region   Muscle Mass Loss:  7 - Severe muscle mass loss Temples (temporalis), Clavicles (pectoralis & deltoids), Thigh (quadraceps), Calf (gastrocnemius), Hand (interosseous), Scapula (trapezius)  Fluid Accumulation:  No significant fluid accumulation     Strength:  Not Performed    Estimated Daily Nutrient Needs:  Energy (kcal):   (30-35 kcal/kg CBW); Weight Used for Energy Requirements:  Current     Protein (g):  60-70 (1.5-1.8); Weight Used for Protein Requirements:  Current        Fluid (ml/day):  ; Method Used for Fluid Requirements:  1 ml/kcal      Nutrition Related Findings:  pt disoriented, nonverbal, soft abd, active BS, no noted edema, fluids WNL, cachectic      Wounds:   (scalp lesion)       Current Nutrition Therapies:    Adult Oral Nutrition Supplement; Standard High Calorie/High Protein Oral Supplement  ADULT DIET;  Regular    Anthropometric Measures:  · Height: 5' 6\" (167.6 cm)  · Current Body Weight: 87 lb (39.5 kg) (bed scale 8/18)   · Usual Body Weight:  (<100# per daughter, no wt hx on file)     · Ideal Body Weight: 142 lbs; % Ideal Body Weight 61.3 %   · BMI: 14  · BMI Categories: Underweight (BMI less than 22) age over 72       Nutrition Diagnosis:   · Severe malnutrition, In context of chronic illness related to cognitive or neurological impairment as evidenced by poor intake prior to admission, severe loss of subcutaneous fat, severe muscle loss      Nutrition Interventions:   Food and/or Nutrient Delivery:  Continue Current Diet, Continue Oral Nutrition Supplement  Nutrition Education/Counseling:  Education initiated (reviewed ONS use w/ daughter)   Coordination of Nutrition Care:  Continue to monitor while inpatient    Goals:  Consume >25% meals/ONS       Nutrition Monitoring and Evaluation:   Food/Nutrient Intake Outcomes:  Diet Advancement/Tolerance, Food and Nutrient Intake, Supplement Intake  Physical Signs/Symptoms Outcomes:  Biochemical Data, GI Status, Fluid Status or Edema, Nutrition Focused Physical Findings, Skin, Weight     Discharge Planning:     Too soon to determine     Electronically signed by Jesus Eller, MS, RD, LD on 8/18/21 at 11:31 AM EDT    Contact: [phone]

## 2021-08-18 NOTE — PROGRESS NOTES
Hospitalist Progress Note      Synopsis: Patient admitted on 8/15/2021 for shortness of breath. Found to have pneumonia and started on IV antibiotics. 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 Pt was also noted to have elevated troponin which cardiology does not plan any further workup. Pt to follow up outpatient for suspicious lesion on R scalp with dermatology     Subjective    Continues on oxygen  Shortness of breath improving     Exam:  /88   Pulse 105   Temp 98 °F (36.7 °C) (Temporal)   Resp 23   Ht 5' 6\" (1.676 m)   Wt 87 lb 3.2 oz (39.6 kg)   SpO2 100%   BMI 14.07 kg/m²   General appearance: Frail elderly gentleman  HEENT: Pupils equal, round, and reactive to light. Conjunctivae/corneas clear. Neck: Supple. No jugular venous distention. Trachea midline. Respiratory:  Normal respiratory effort. Clear to auscultation, bilaterally without Rales/Wheezes/Rhonchi. Cardiovascular: Regular rate and rhythm with normal S1/S2 without murmurs, rubs or gallops. Abdomen: Soft, non-tender, non-distended with normal bowel sounds. Musculoskeletal: No clubbing, cyanosis or edema bilaterally. Brisk capillary refill. 2+ lower extremity pulses (dorsalis pedis).    Skin:  No rashes    Neurologic: Oriented to person     Medications:  Reviewed    Infusion Medications    sodium chloride       Scheduled Medications    memantine  5 mg Oral Daily    therapeutic multivitamin-minerals  1 tablet Oral Daily    vitamin B-12  500 mcg Oral Daily    cefTRIAXone (ROCEPHIN) IV  1,000 mg Intravenous Q24H    doxycycline (VIBRAMYCIN) IV  100 mg Intravenous Q12H    sodium chloride flush  5-40 mL Intravenous 2 times per day    enoxaparin  40 mg Subcutaneous Daily    albuterol  2.5 mg Nebulization 4x daily     PRN Meds: sodium chloride flush, sodium chloride, ondansetron **OR** ondansetron, polyethylene glycol, acetaminophen **OR** acetaminophen    I/O    Intake/Output Summary (Last 24 hours) at 8/18/2021 [place] filed at 8/18/2021 0630  Gross per 24 hour   Intake 60 ml   Output --   Net 60 ml       Labs:   Recent Labs     08/15/21  2028 08/16/21  0543   WBC 12.3* 7.1   HGB 10.3* 8.7*   HCT 32.0* 27.4*    143       Recent Labs     08/15/21  2028 08/16/21  0543    139   K 4.0 3.0*    113*   CO2 29 20*   BUN 23 17   CREATININE 0.8 0.5*   CALCIUM 8.6 5.2*       No results for input(s): PROT, ALB, ALKPHOS, ALT, AST, BILITOT, AMYLASE, LIPASE in the last 72 hours. No results for input(s): INR in the last 72 hours. No results for input(s): Sriram Seed in the last 72 hours. Chronic labs:  Lab Results   Component Value Date    CHOL 154 01/27/2014    TRIG 92 01/27/2014    HDL 55 01/27/2014    LDLCALC 81 01/27/2014    INR 1.1 10/19/2019       Radiology:  Imaging studies reviewed today. ASSESSMENT:  Sepsis 2/2 pneumonia  Elevated troponin  Acute hypoxic respiratory failure  Hypomagnesemia and hypokalemia  Suspected dementia  Severe protein calorie malnutrition     PLAN:  Wean oxygen as tolerated - pt didn't tolerate weaning today and became tachypneic  Continue antibiotics, follow cultures  Oral supplementation  Troponin likely due to pneumonia and hypoxia - cardiology does not recommend any acute intervention  PT/OT  [place]CP [place]   Replace electrolytes as indicated   Repeat CXR, continuous pulse ox  Will get repeat labs today    Diet: Adult Oral Nutrition Supplement; Standard High Calorie/High Protein Oral Supplement  ADULT DIET; Regular  Code Status: DNR-CC  PT/OT Eval Status:   ordered  DVT Prophylaxis:   lovenox  Recommended disposition at discharge:  home w hhc at [place]     +++++++++++++++++++++++++++++++++++++++++++++++++  Toni Small MD   [facility].  +++++++++++++++++++++++++++++++++++++++++++++++++  NOTE: This report was transcribed using voice recognition software.  Every effort was made to ensure accuracy; however, inadvertent computerized transcription errors may be present.

## 2021-08-18 NOTE — CARE COORDINATION
New PCP appointment made and placed on AVS. Discussed with daughter as well. Discussed possible need for oxygen, no preference on DME company if needed [facility] with [facility]. For questions I can be reached at [phone]. Merline Lawman, [place]      The Plan for Transition of Care is related to the following treatment goals: discharge planning when stable     The Patient and/or patient representative Dayanaramarietta Gail was provided with a choice of provider and agrees   with the discharge plan. [x] Yes [] No    Freedom of choice list was provided with basic dialogue that supports the patient's individualized plan of care/goals, treatment preferences and shares the quality data associated with the providers.  [x] Yes [] No

## 2021-08-19 NOTE — PROGRESS NOTES
Physical Therapy    PT order received and medical chart reviewed 8/19 PM. Per RN, family is now considering Hospice. Pt was resting comfortably in bed and RN suggested holding initial evaluation until family makes a decision. Will re-attempt tomorrow. Thank you.     An Tavares, PT, DPT  [id number]

## 2021-08-19 NOTE — PROGRESS NOTES
Hospitalist Progress Note      Synopsis: Patient admitted on 8/15/2021 for shortness of breath. Found to have pneumonia and started on IV antibiotics. 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 Pt was also noted to have elevated troponin which cardiology does not plan any further workup. Pt to follow up outpatient for suspicious lesion on R scalp with dermatology     Subjective    Continues on oxygen  Clinically worsening  Discussed hospice with daughter and she is open to considering     Exam:  BP (!) 153/89   Pulse 103   Temp 98.6 °F (37 °C) (Axillary)   Resp 18   Ht 5' 6\" (1.676 m)   Wt 87 lb 3.2 oz (39.6 kg)   SpO2 100%   BMI 14.07 kg/m²   General appearance: Frail elderly gentleman  HEENT: Pupils equal, round, and reactive to light. Conjunctivae/corneas clear. Neck: Supple. No jugular venous distention. Trachea midline. Respiratory:  Normal respiratory effort. Clear to auscultation, bilaterally without Rales/Wheezes/Rhonchi. Cardiovascular: Regular rate and rhythm with normal S1/S2 without murmurs, rubs or gallops. Abdomen: Soft, non-tender, non-distended with normal bowel sounds. Musculoskeletal: No clubbing, cyanosis or edema bilaterally. Brisk capillary refill. 2+ lower extremity pulses (dorsalis pedis).    Skin:  No rashes    Neurologic: Oriented to person     Medications:  Reviewed    Infusion Medications    sodium chloride       Scheduled Medications    sodium chloride  500 mL Intravenous Once    cefTRIAXone (ROCEPHIN) IV  2,000 mg Intravenous Q24H    memantine  5 mg Oral Daily    therapeutic multivitamin-minerals  1 tablet Oral Daily    vitamin B-12  500 mcg Oral Daily    sodium chloride flush  5-40 mL Intravenous 2 times per day    enoxaparin  40 mg Subcutaneous Daily    albuterol  2.5 mg Nebulization 4x daily     PRN Meds: sodium chloride flush, sodium chloride, ondansetron **OR** ondansetron, polyethylene glycol, acetaminophen **OR** acetaminophen    I/O    Intake/Output Summary (Last 24 hours) at 8/19/2021 1349  Last data filed at 8/19/2021 0626  Gross per 24 hour   Intake 204 ml   Output 150 ml   Net 54 ml       Labs:   Recent Labs     08/18/21  1714   WBC 11.9*   HGB 9.7*   HCT 31.1*          Recent Labs     08/18/21  1714      K 4.2   CL 99   CO2 29   BUN 49*   CREATININE 1.5*   CALCIUM 8.4*       Recent Labs     08/18/21  1714   PROT 6.8   ALKPHOS 150*   *   AST 1,112*   BILITOT 0.5       No results for input(s): INR in the last 72 hours. No results for input(s): Chung Clap in the last 72 hours. Chronic labs:  Lab Results   Component Value Date    CHOL 154 01/27/2014    TRIG 92 01/27/2014    HDL 55 01/27/2014    LDLCALC 81 01/27/2014    INR 1.1 10/19/2019       Radiology:  Imaging studies reviewed today. ASSESSMENT:  Sepsis 2/2 pneumonia  Elevated troponin  Acute hypoxic respiratory failure  Hypomagnesemia and hypokalemia  Suspected dementia  Severe protein calorie malnutrition  Transaminitis  KEHINDE      PLAN:  Wean oxygen as tolerated - pt didn't tolerate weaning today and became tachypneic  Continue antibiotics, follow cultures  Bolus as indicated; started gentle fluids given KEHINDE following bolus today   Strict I/O  Troponin likely due to pneumonia and hypoxia - cardiology does not recommend any acute intervention  PT/OT  Needs new PCP at [place]   Replace electrolytes as indicated   Appreciate ID input  Family considering dc home w hospice     Diet: Adult Oral Nutrition Supplement; Standard High Calorie/High Protein Oral Supplement  ADULT DIET; Regular  Code Status: DNR-CC  PT/OT Eval Status:   ordered  DVT Prophylaxis:   lovenox  Recommended disposition at discharge:  home w hhc at [place]     +++++++++++++++++++++++++++++++++++++++++++++++++  Heather Guajardo MD   [facility].  +++++++++++++++++++++++++++++++++++++++++++++++++  NOTE: This report was transcribed using voice recognition software. Every effort was made to ensure accuracy; however, inadvertent computerized transcription errors may be present.

## 2021-08-19 NOTE — CONSULTS
[address] [place] [address] [facility]    Consultation Note     Admit Date: 8/15/2021  8:07 PM    Reason for Consult:   Right lung pneumonia    Attending Physician:  Audrey Bonds MD     Chief Complaint: Shortness of breath and cough    HISTORY OF PRESENT ILLNESS:   The patient is a 80 y.o.  man not known to the Infectious Diseases service. The patient is admitted through the emergency room with cough fever 101 and shortness of breath. He is hypoxic is only 84% on room air. K emergency room his white count was 12.3 and hemoglobin was 10. Urine antigen for strep pneumo was positive Legionella was negative. Covid was negative. He was started on Rocephin and doxycycline and ID has been asked to evaluate. Daughter says that this started about 5 days ago 6 days ago over the weekend with cough nonproductive note continued with chills and rigors to the point where he became very lethargic. Currently he is sensory lethargic he opens his eyes to stimulation but no real good communication at this point    Past Medical History:        Diagnosis Date    Cancer [facility]) 20 years ago    right lung    History of EKG 01/23/2014    per Dr Eduardo Price on chart.     Hoarseness     Hyperlipidemia     Hypertension     no recent problems    Macular degeneration      Past Surgical History:        Procedure Laterality Date    APPENDECTOMY      CATARACT REMOVAL WITH IMPLANT Bilateral     LARYNGOSCOPY  01/30/2014    LUNG SURGERY Left 20 years ago    \"portion of lung removed, bottom lobe for mass, no further traetments\"     Current Medications:   Scheduled Meds:   sodium chloride  500 mL Intravenous Once    memantine  5 mg Oral Daily    therapeutic multivitamin-minerals  1 tablet Oral Daily    vitamin B-12  500 mcg Oral Daily    cefTRIAXone (ROCEPHIN) IV  1,000 mg Intravenous Q24H    doxycycline (VIBRAMYCIN) IV  100 mg Intravenous Q12H    sodium chloride flush  5-40 mL Intravenous 2 times per day    enoxaparin  40 mg Subcutaneous Daily    albuterol  2.5 mg Nebulization 4x daily     Continuous Infusions:   sodium chloride       PRN Meds:sodium chloride flush, sodium chloride, ondansetron **OR** ondansetron, polyethylene glycol, acetaminophen **OR** acetaminophen    Allergies:  Patient has no known allergies. Social History:   Social History     Socioeconomic History    Marital status:      Spouse name: None    Number of children: None    Years of education: None    Highest education level: None   Occupational History    None   Tobacco Use    Smoking status: Former Smoker    Smokeless tobacco: Never Used    Tobacco comment: quit prior to lung surgery more than 20 years ago   Vaping Use    Vaping Use: Never used   Substance and Sexual Activity    Alcohol use: Yes     Comment: rarely a beer    Drug use: No    Sexual activity: None   Other Topics Concern    None   Social History Narrative    None     Social Determinants of Health     Financial Resource Strain:     Difficulty of Paying Living Expenses:    Food Insecurity:     Worried About Running Out of Food in the Last Year:     Ran Out of Food in the Last Year:    Transportation Needs:     Lack of Transportation (Medical):  Lack of Transportation (Non-Medical):    Physical Activity:     Days of Exercise per Week:     Minutes of Exercise per Session:    Stress:     Feeling of Stress :    Social Connections:     Frequency of Communication with Friends and Family:     Frequency of Social Gatherings with Friends and Family:     Attends Jainism Services:     Active Member of Clubs or Organizations:     Attends Club or Organization Meetings:     Marital Status:    Intimate Partner Violence:     Fear of Current or Ex-Partner:     Emotionally Abused:     Physically Abused:     Sexually Abused:      Family History:   History reviewed. No pertinent family history. . Otherwise non-pertinent to the chief complaint.     REVIEW OF SYSTEMS:    CONSTITUTIONAL: Positive chills, positive fevers or night sweats. No loss of weight. EYES:  No double vision or drainage from eyes, ears or throat. HEENT:  No neck stiffness. No dysphagia. No drainage from eyes, ears or throat  RESPIRATORY: Positive cough, productive sputum or hemoptysis. CARDIOVASCULAR:  No chest pain, palpitations, orthopnea or dyspnea on exertion. GASTROINTESTINAL:  No nausea, vomiting, diarrhea or constipation or hematochezia   GENITOURINARY:  No frequency burning dysuria or hematuria. INTEGUMENT/BREAST:  No rash or breast masses. HEMATOLOGIC/LYMPHATIC:  No lymphadenopathy or blood dyscrasics. ALLERGIC/IMMUNOLOGIC:  No anaphylaxis. ENDOCRINE:  No polyuria or polydipsia or temperature intolerance. MUSCULOSKELETAL:  No myalgia or arthralgia. Full ROM. NEUROLOGICAL:  No focal motor sensory deficit. BEHAVIOR/PSYCH:  No psychosis. PHYSICAL EXAM:    Vitals:    BP (!) 153/89   Pulse 103   Temp 98.6 °F (37 °C) (Axillary)   Resp 18   Ht 5' 6\" (1.676 m)   Wt 87 lb 3.2 oz (39.6 kg)   SpO2 95%   BMI 14.07 kg/m²   Constitutional: The patient is lethargic  Skin: Warm and dry. No rashes were noted. No jaundice. HEENT: Eyes show round, and reactive pupils. Dry mucous membranes, no ulcerations, no thrush. Neck: Supple to movements. No lymphadenopathy. Chest: No use of accessory muscles to breathe. Symmetrical expansion. Auscultation reveals no wheezing, positive right lung basilar crackles  Cardiovascular: S1 and S2 are rhythmic and regular. No murmurs appreciated. Abdomen: Positive bowel sounds to auscultation. Benign to palpation. No masses felt. No hepatosplenomegaly. Genitourinary: Male  Extremities: No clubbing, no cyanosis, no edema.   Musculoskeletal: Equal and symmetrical  Neurological: No focal  Lines: peripheral      CBC+dif:  Recent Labs     08/18/21  1714   WBC 11.9*   HGB 9.7*   HCT 31.1*   MCV 98.1        Lab Results   Component Value Date CRP 0.6 (H) 10/20/2019     No results found for: CRPHS  Lab Results   Component Value Date    SEDRATE 30 (H) 10/20/2019     Lab Results   Component Value Date     (H) 08/18/2021    AST 1,112 (H) 08/18/2021    ALKPHOS 150 (H) 08/18/2021    BILITOT 0.5 08/18/2021     Lab Results   Component Value Date     08/18/2021    K 4.2 08/18/2021    K 3.0 08/16/2021    CL 99 08/18/2021    CO2 29 08/18/2021    BUN 49 08/18/2021    CREATININE 1.5 08/18/2021    GFRAA 53 08/18/2021    LABGLOM 44 08/18/2021    GLUCOSE 151 08/18/2021    GLUCOSE 93 06/05/2012    PROT 6.8 08/18/2021    LABALBU 3.6 08/18/2021    LABALBU 4.3 06/05/2012    CALCIUM 8.4 08/18/2021    BILITOT 0.5 08/18/2021    ALKPHOS 150 08/18/2021    AST 1,112 08/18/2021     08/18/2021       Lab Results   Component Value Date    PROTIME 11.7 10/19/2019    PROTIME 11.6 03/31/2011    INR 1.1 10/19/2019       No results found for: TSH    Lab Results   Component Value Date    COLORU Yellow 08/19/2021    PHUR 5.5 08/19/2021    WBCUA 0-1 10/19/2019    RBCUA NONE 10/19/2019    BACTERIA RARE 10/19/2019    CLARITYU Clear 08/19/2021    SPECGRAV 1.025 08/19/2021    LEUKOCYTESUR Negative 08/19/2021    UROBILINOGEN 0.2 08/19/2021    BILIRUBINUR Negative 08/19/2021    BLOODU Negative 08/19/2021    GLUCOSEU Negative 08/19/2021       No results found for: [place], BEART, H9LIFNZX, PHART, THGBART, OES5JXS, PO2ART, KJJ7YQX  Radiology:  XR CHEST (2 VW)   Final Result   Progression of bilateral infiltrates since August 15, with more extensive   consolidation in the right parahilar region. CT HEAD WO CONTRAST   Final Result   No acute intracranial findings. Consider MRI if symptoms persist.      Volume loss and findings suggestive of chronic microvascular ischemia. CTA CHEST W CONTRAST   Final Result   No identified pulmonary embolism.       Prominent airspace consolidation in the right lung most notable towards the   base consistent with multifocal pneumonia. Follow-up to resolution   recommended. 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 Mild cardiomegaly. Other chronic appearing findings. XR CHEST PORTABLE   Final Result   Chronic bilateral pleural plaques which are similar in appearance to the   prior study. Dense consolidative airspace opacities in the right mid to   lower lung zone are felt to likely be reflective of pneumonia. Lucency between a large, calcified plaque at the peripheral left mid lung   zone in the left lateral ribs was present on the prior examination. Unfortunately, given the appearance, I cannot completely exclude a left   basilar pneumothorax although this is not likely. Correlation with CT of the   chest could be considered, if clinically appropriate. US ABDOMEN LIMITED Specify organ? LIVER    (Results Pending)       Microbiology:  Pending  No results for input(s): BC in the last 72 hours. No results for input(s): ORG in the last 72 hours. No results for input(s): Terrilyn Jay Jay in the last 72 hours. Recent Labs     08/19/21  0520   STREPNEUMAGU Urine specimen POSITIVE for Pneumococcal pneumonia. Normal Range: Presumptive Negative       Recent Labs     08/19/21  0520   LP1UAG Presumptive Negative -suggesting no recent or current infections  with Legionella pneumophila serogroup 1. Infection to Legionella cannot be ruled out since other serogroups  and species may cause infection, antigen may not be present in  early infection, or level of antigen may be below the  detection limit. Normal Range: Presumptive Negative       No results for input(s): ASO in the last 72 hours. No results for input(s): CULTRESP in the last 72 hours.     Assessment:  · Right lung consolidating pneumonia with positive urine antigen for strep pneumo  · Patient is 80years old and outcome in the situation is poor    Plan:    · Cont ceftriaxone 2 g daily  · Continue to hydrate  · Discuss the gravity of the situation with the daughter  · Check cultures  · Baseline ESR, CRP  · Monitor labs  · Will follow with you    Thank you for having us see this patient in consultation. I will be discussing this case with the treating physicians.       Electronically signed by Jeri Maher MD on 8/19/2021 at 12:10 PM

## 2021-08-19 NOTE — CARE COORDINATION
Care Coordination:  Met with daughter this date to discuss discharge planning. She is concerned regarding being able to manage his current needs at home. PT /Ot evals pending. Daughter interested in [address] for additional rehab prior to discharge home. Provided list and will check back for choices  . Will require pre cert. Bienvenido Barber MD spoke to daughter and discussed option to discharge to home with Hospice support. .  SW provided choices and daughter requests to speak to Keri Do. Referral called today. Will follow for discharge planning,. The Plan for Transition of Care is related to the following treatment goals: Hospice     The Patient and/or patient representative darrell Resendiz was provided with a choice of provider and agrees   with the discharge plan. [x] Yes [] No    Freedom of choice list was provided with basic dialogue that supports the patient's individualized plan of care/goals, treatment preferences and shares the quality data associated with the providers.  [x] Yes [] No

## 2021-08-19 NOTE — PROGRESS NOTES
[facility]     Liaison Information Visit Note              Patient Name: Leopold Form   :  2/10/1927  MRN:  [id number]  Admit date:  8/15/2021   Hospital Admitting Physician:  Satnam Xiong MD   PCP:  Ruth Ann Baker MD  Primary Insurance: Payor: [facility] /  /  /    Emergency Contact:      Contact/Relation:   /         Phone:     Advance Directive  Patient has a documented healthcare surrogate  Discussed with: Family member  DPOA-HC Name-Relation: maki   Phone:     Terminal Diagnosis hypoxic respirtory failure per Dr Otf Garrett Problem List:   Patient Active Problem List   Diagnosis Code    Altered mental status D95.75    Metabolic encephalopathy X66.93    PNA (pneumonia) J18.9    Sepsis ([address] A41.9    Acute respiratory failure with hypoxia ([address] J96.01    Scalp lesion L98.9    Anemia D64.9    Dementia ([address] F03.90    History of lung cancer Z85. 118    Severe protein-calorie malnutrition ([address] E43    Elevated troponin R77.8    Hypokalemia E87.6       Code Status Order: DNR-CC     Allergies:  Patient has no known allergies. Family Goal: home with hospice    Meeting held with Daughter Tommie Sharpe      The hospice benefit and philosophy were explained including that hospice is end of life care in which, per Medicare, a patient has a terminal diagnosis that life expectancy would be 6 months or less. Hospice care is a service that is covered by most insurance plans. The following levels of hospice care were discussed including, routine level of hospice care at private home or facility, which patient/family is responsible for any room and board fees at the facility, and general in patient level of care (GIP) at the [facility] for short term symptom management. Per Medicare guidelines, a patient is considered appropriate for GIP if they have uncontrolled symptoms such as pain, agitation, labored breathing or nausea/vomiting.   Once symptoms become managed, the patient would need to be moved to a lower level of care such as home with hospice, ECF with hospice or the Transition program.  [place] [address] transition program also explained which is routine hospice care provided at the [place] [address] instead of an ECF or home. The transition program is private pay $300/day for room/board. Room/board for the transition program is not covered by Medicaid as would be in an ECF. Family informed that with the routine level of care at home or ECF,  the hospice team consists of the RN who visits 1-3 times a week, a  who visits within the first five days of the hospice election, the personal care team who visit 1-3 times a week, non-medical volunteers and Chaplains. Explained that at home in routine level of care, familles are responsible for the 24 hour care. Discussed that under hospice care patient would not receive chemotherapy, radiation, immune therapy, IV antibiotics, dialysis or blood transfusions. Explained that once in hospice care, all aggressive treatments would be stopped and allow nature to takes its course with focus on comfort care for the patient. Erika Maria states that the pt already lives with her and always has someone with him. Erika Maria is is agreeable to pt coming home tomorrow around 11 am.  DME ordered. Swetha requests [facility] medical directory to follow. Requested comfort medications from physician via charge nurse. Swetha can be reached via her cell phone tomorrow [phone]    Discharge Plan:  Discharge Disposition; Home with hospice    [facility] plan:  1.  home with hospice   2. Please call Wai Stanley [phone] with any questions. 3. Patient not currently under the care of hospice.     Electronically signed by Doug Tavera RN on 8/19/2021 at 3:26 PM

## 2021-08-19 NOTE — ACP (ADVANCE CARE PLANNING)
ADVANCED CARE PLANNING    Patient Name: Milli Blanchard       YOB: 1927              MRN:    [id number]  Admission Date:  8/15/2021  8:07 PM    Active Diagnoses: Active Problems:    Altered mental status    PNA (pneumonia)    Sepsis ([address]    Acute respiratory failure with hypoxia (HCC)    Scalp lesion    Anemia    Dementia (HCC)    History of lung cancer    Severe protein-calorie malnutrition (HCC)    Elevated troponin    Hypokalemia  Resolved Problems:    * No resolved hospital problems. *      These active diagnoses are of sufficient risk that focused discussion on advanced care planning is indicated in order to allow the patient to thoughtfully consider personal goals of care; and, if situations arise that prevent the patient to personally give input, to ensure appropriate representation of their personal desire for different levels and levels of care. Persons present in discussion: Tamara Lennon MD, Family members: daughter Helen Clay    Discussion: I reviewed his admission for pneumonia and his desires for ongoing current care. Pt is going to continue with current level of care and code status of DNR-CC. Discussed hospice with daughter, who is DPOA and she plans on discussion with family and is open to possible discharge home on hospice following this hospitalization. Time Spent on Advanced Planning Documents: 16 minutes    Electronically signed by Jack Ryan MD on 8/19/2021 at 1:52 PM    NOTE: This report was transcribed using voice recognition software. Every effort was made to ensure accuracy; however, inadvertent computerized transcription errors may be present.

## 2021-08-19 NOTE — CARE COORDINATION
Care Coordination  :  Julia from [address] [place] [address] met with daughter this date. Plan is to discharge  home with Hospice tomorrow.   Julia informed RN

## 2021-08-20 NOTE — PROGRESS NOTES
CLINICAL PHARMACY NOTE: MEDS TO BEDS    Total # of Prescriptions Filled: 5   The following medications were delivered to the patient:  · Lorazepam 0.5  · Memantine 5  · Glycopyrrolate 1  · Amoxicillin 400-57 Suspension  · Morphine Sulfate 20/ML    Additional Documentation:

## 2021-08-20 NOTE — PROGRESS NOTES
Patient set up for transport home at noon today with [facility]. Family at bedside and updated. Charge nurse, CM and SW updated as well. [facility] intake updated.

## 2021-08-20 NOTE — PROGRESS NOTES
Spoke with outpatient pharmacy to let them know pt is due leave any time and they stated they will bring his meds as soon as they are ready to bedside.

## 2021-09-14 NOTE — DISCHARGE SUMMARY
without murmurs, rubs or gallops. Abdomen: Soft, non-tender, non-distended with normal bowel sounds. Musculoskeletal: No clubbing, cyanosis or edema bilaterally. Brisk capillary refill. 2+ lower extremity pulses (dorsalis pedis). Skin:  No rashes    Neurologic: Oriented to person     Consults:     IP CONSULT TO HOSPITALIST  IP CONSULT TO PALLIATIVE CARE  IP CONSULT TO CARDIOLOGY  IP CONSULT TO INFECTIOUS DISEASES  IP CONSULT TO HOSPICE    Significant Diagnostic Studies:     XR CHEST (2 VW)   Final Result   Progression of bilateral infiltrates since August 15, with more extensive   consolidation in the right parahilar region. CT HEAD WO CONTRAST   Final Result   No acute intracranial findings. Consider MRI if symptoms persist.      Volume loss and findings suggestive of chronic microvascular ischemia. CTA CHEST W CONTRAST   Final Result   No identified pulmonary embolism. Prominent airspace consolidation in the right lung most notable towards the   base consistent with multifocal pneumonia. Follow-up to resolution   recommended. 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 Mild cardiomegaly. Other chronic appearing findings. XR CHEST PORTABLE   Final Result   Chronic bilateral pleural plaques which are similar in appearance to the   prior study. Dense consolidative airspace opacities in the right mid to   lower lung zone are felt to likely be reflective of pneumonia. Lucency between a large, calcified plaque at the peripheral left mid lung   zone in the left lateral ribs was present on the prior examination. Unfortunately, given the appearance, I cannot completely exclude a left   basilar pneumothorax although this is not likely. Correlation with CT of the   chest could be considered, if clinically appropriate.                Disposition:  Home on hospice       Discharge Instructions/Follow-up:  Keep scheduled follow up appointments. Take medications as prescribed. Code Status:  Prior     Activity: activity as tolerated    Diet: regular diet    Labs: For convenience and continuity at follow-up the following most recent labs are provided:      CBC:    Lab Results   Component Value Date    WBC 11.9 08/18/2021    HGB 9.7 08/18/2021    HCT 31.1 08/18/2021     08/18/2021       Renal:    Lab Results   Component Value Date     08/18/2021    K 4.2 08/18/2021    K 3.0 08/16/2021    CL 99 08/18/2021    CO2 29 08/18/2021    BUN 49 08/18/2021    CREATININE 1.5 08/18/2021    CALCIUM 8.4 08/18/2021       Discharge Medications:     Discharge Medication List as of 8/20/2021 11:44 AM           Details   memantine (NAMENDA) 5 MG tablet Take 1 tablet by mouth daily, Disp-60 tablet, R-3Normal      Morphine Sulfate (MORPHINE 20MG/ML) SOLN concentrated solution Take 0.125 mLs by mouth every 2 hours as needed for Pain for up to 20 days. , Disp-30 mL, R-0Normal      LORazepam (ATIVAN) 0.5 MG tablet Take 1 tablet by mouth every 4 hours as needed for Anxiety for up to 5 days. , Disp-15 tablet, R-0Normal      glycopyrrolate (ROBINUL) 1 MG tablet Take 1 tablet by mouth 3 times daily as needed (secretions), Disp-90 tablet, R-3Normal      amoxicillin-clavulanate (AUGMENTIN) 250-62.5 MG/5ML suspension Take 10 mLs by mouth 2 times daily for 5 days, Disp-100 mL, R-0Normal              Details   traZODone (DESYREL) 50 MG tablet Take 0.5 tablets by mouth nightly, Disp-30 tablet, R-5Normal             Time Spent on discharge is more than 45 minutes in the examination, evaluation, counseling and review of medications and discharge plan.       Signed:    Jatinder Sullivan MD   9/14/2021